# Patient Record
Sex: MALE | Race: WHITE | NOT HISPANIC OR LATINO | Employment: FULL TIME | URBAN - METROPOLITAN AREA
[De-identification: names, ages, dates, MRNs, and addresses within clinical notes are randomized per-mention and may not be internally consistent; named-entity substitution may affect disease eponyms.]

---

## 2017-01-26 ENCOUNTER — OFFICE VISIT (OUTPATIENT)
Dept: PSYCHIATRY | Facility: CLINIC | Age: 32
End: 2017-01-26
Payer: COMMERCIAL

## 2017-01-26 VITALS
HEART RATE: 88 BPM | DIASTOLIC BLOOD PRESSURE: 79 MMHG | HEIGHT: 72 IN | WEIGHT: 213 LBS | SYSTOLIC BLOOD PRESSURE: 133 MMHG | BODY MASS INDEX: 28.85 KG/M2

## 2017-01-26 DIAGNOSIS — S06.9X9S TBI (TRAUMATIC BRAIN INJURY), WITH LOSS OF CONSCIOUSNESS OF UNSPECIFIED DURATION, SEQUELA: ICD-10-CM

## 2017-01-26 DIAGNOSIS — F32.A DEPRESSION, UNSPECIFIED DEPRESSION TYPE: ICD-10-CM

## 2017-01-26 DIAGNOSIS — F90.9 ATTENTION DEFICIT HYPERACTIVITY DISORDER (ADHD), UNSPECIFIED ADHD TYPE: ICD-10-CM

## 2017-01-26 DIAGNOSIS — F43.10 PTSD (POST-TRAUMATIC STRESS DISORDER): Primary | ICD-10-CM

## 2017-01-26 PROCEDURE — 3075F SYST BP GE 130 - 139MM HG: CPT | Mod: S$GLB,,, | Performed by: PSYCHIATRY & NEUROLOGY

## 2017-01-26 PROCEDURE — 99999 PR PBB SHADOW E&M-EST. PATIENT-LVL III: CPT | Mod: PBBFAC,,, | Performed by: PSYCHIATRY & NEUROLOGY

## 2017-01-26 PROCEDURE — 1160F RVW MEDS BY RX/DR IN RCRD: CPT | Mod: S$GLB,,, | Performed by: PSYCHIATRY & NEUROLOGY

## 2017-01-26 PROCEDURE — 3078F DIAST BP <80 MM HG: CPT | Mod: S$GLB,,, | Performed by: PSYCHIATRY & NEUROLOGY

## 2017-01-26 PROCEDURE — 99215 OFFICE O/P EST HI 40 MIN: CPT | Mod: S$GLB,,, | Performed by: PSYCHIATRY & NEUROLOGY

## 2017-01-26 RX ORDER — ESCITALOPRAM OXALATE 10 MG/1
10 TABLET ORAL DAILY
Qty: 30 TABLET | Refills: 2 | Status: SHIPPED | OUTPATIENT
Start: 2017-01-26 | End: 2017-03-03 | Stop reason: DRUGHIGH

## 2017-01-26 RX ORDER — PRAZOSIN HYDROCHLORIDE 2 MG/1
2 CAPSULE ORAL NIGHTLY
Qty: 30 CAPSULE | Refills: 1 | Status: SHIPPED | OUTPATIENT
Start: 2017-01-26 | End: 2017-03-03 | Stop reason: SDUPTHER
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
## 2017-02-02 PROBLEM — S06.9XAA TBI (TRAUMATIC BRAIN INJURY): Status: ACTIVE | Noted: 2017-02-02

## 2017-02-03 NOTE — PROGRESS NOTES
This encounter was reviewed by me and case was discussed with the resident physician. Would use caution in using ssri given unclear mood stability, consider mood stabilizer, would encourage pt to stop ritalin if having mood swings. Recommend formal psych testing.

## 2017-02-16 ENCOUNTER — OFFICE VISIT (OUTPATIENT)
Dept: PSYCHIATRY | Facility: CLINIC | Age: 32
End: 2017-02-16
Payer: COMMERCIAL

## 2017-02-16 VITALS
DIASTOLIC BLOOD PRESSURE: 79 MMHG | HEART RATE: 83 BPM | HEIGHT: 71 IN | BODY MASS INDEX: 29.93 KG/M2 | WEIGHT: 213.81 LBS | SYSTOLIC BLOOD PRESSURE: 133 MMHG

## 2017-02-16 DIAGNOSIS — F32.A DEPRESSION, UNSPECIFIED DEPRESSION TYPE: ICD-10-CM

## 2017-02-16 DIAGNOSIS — S06.9X9S TBI (TRAUMATIC BRAIN INJURY), WITH LOSS OF CONSCIOUSNESS OF UNSPECIFIED DURATION, SEQUELA: ICD-10-CM

## 2017-02-16 DIAGNOSIS — F43.10 PTSD (POST-TRAUMATIC STRESS DISORDER): ICD-10-CM

## 2017-02-16 DIAGNOSIS — F90.2 ATTENTION DEFICIT HYPERACTIVITY DISORDER (ADHD), COMBINED TYPE: Primary | ICD-10-CM

## 2017-02-16 PROCEDURE — 99999 PR PBB SHADOW E&M-EST. PATIENT-LVL II: CPT | Mod: PBBFAC,,, | Performed by: PSYCHIATRY & NEUROLOGY

## 2017-02-16 PROCEDURE — 3078F DIAST BP <80 MM HG: CPT | Mod: S$GLB,,, | Performed by: PSYCHIATRY & NEUROLOGY

## 2017-02-16 PROCEDURE — 3075F SYST BP GE 130 - 139MM HG: CPT | Mod: S$GLB,,, | Performed by: PSYCHIATRY & NEUROLOGY

## 2017-02-16 PROCEDURE — 99213 OFFICE O/P EST LOW 20 MIN: CPT | Mod: S$GLB,,, | Performed by: PSYCHIATRY & NEUROLOGY

## 2017-02-16 PROCEDURE — 1160F RVW MEDS BY RX/DR IN RCRD: CPT | Mod: S$GLB,,, | Performed by: PSYCHIATRY & NEUROLOGY
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
## 2017-02-23 ENCOUNTER — OFFICE VISIT (OUTPATIENT)
Dept: PSYCHIATRY | Facility: CLINIC | Age: 32
End: 2017-02-23
Payer: COMMERCIAL

## 2017-02-23 DIAGNOSIS — F32.A DEPRESSION, UNSPECIFIED DEPRESSION TYPE: ICD-10-CM

## 2017-02-23 DIAGNOSIS — S06.9X9S TBI (TRAUMATIC BRAIN INJURY), WITH LOSS OF CONSCIOUSNESS OF UNSPECIFIED DURATION, SEQUELA: Primary | ICD-10-CM

## 2017-02-23 PROCEDURE — 90834 PSYTX W PT 45 MINUTES: CPT | Mod: S$GLB,,, | Performed by: SOCIAL WORKER
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
## 2017-03-03 ENCOUNTER — OFFICE VISIT (OUTPATIENT)
Dept: PSYCHIATRY | Facility: CLINIC | Age: 32
End: 2017-03-03
Payer: COMMERCIAL

## 2017-03-03 VITALS
DIASTOLIC BLOOD PRESSURE: 91 MMHG | HEIGHT: 71 IN | WEIGHT: 213 LBS | BODY MASS INDEX: 29.82 KG/M2 | HEART RATE: 101 BPM | SYSTOLIC BLOOD PRESSURE: 140 MMHG

## 2017-03-03 DIAGNOSIS — F43.10 PTSD (POST-TRAUMATIC STRESS DISORDER): ICD-10-CM

## 2017-03-03 DIAGNOSIS — F32.A DEPRESSION, UNSPECIFIED DEPRESSION TYPE: ICD-10-CM

## 2017-03-03 DIAGNOSIS — F90.2 ATTENTION DEFICIT HYPERACTIVITY DISORDER (ADHD), COMBINED TYPE: Primary | ICD-10-CM

## 2017-03-03 DIAGNOSIS — S06.9X9S TBI (TRAUMATIC BRAIN INJURY), WITH LOSS OF CONSCIOUSNESS OF UNSPECIFIED DURATION, SEQUELA: ICD-10-CM

## 2017-03-03 PROCEDURE — 3080F DIAST BP >= 90 MM HG: CPT | Mod: S$GLB,,, | Performed by: PSYCHIATRY & NEUROLOGY

## 2017-03-03 PROCEDURE — 99214 OFFICE O/P EST MOD 30 MIN: CPT | Mod: S$GLB,,, | Performed by: PSYCHIATRY & NEUROLOGY

## 2017-03-03 PROCEDURE — 99999 PR PBB SHADOW E&M-EST. PATIENT-LVL II: CPT | Mod: PBBFAC,,, | Performed by: PSYCHIATRY & NEUROLOGY

## 2017-03-03 PROCEDURE — 1160F RVW MEDS BY RX/DR IN RCRD: CPT | Mod: S$GLB,,, | Performed by: PSYCHIATRY & NEUROLOGY

## 2017-03-03 PROCEDURE — 3077F SYST BP >= 140 MM HG: CPT | Mod: S$GLB,,, | Performed by: PSYCHIATRY & NEUROLOGY

## 2017-03-03 RX ORDER — ESCITALOPRAM OXALATE 20 MG/1
20 TABLET ORAL DAILY
Qty: 30 TABLET | Refills: 2 | Status: SHIPPED | OUTPATIENT
Start: 2017-03-03 | End: 2017-12-04

## 2017-03-03 RX ORDER — TRAZODONE HYDROCHLORIDE 100 MG/1
200 TABLET ORAL NIGHTLY
Qty: 60 TABLET | Refills: 2 | Status: SHIPPED | OUTPATIENT
Start: 2017-03-03 | End: 2017-12-04

## 2017-03-03 RX ORDER — BACLOFEN 10 MG/1
10 TABLET ORAL 3 TIMES DAILY PRN
Qty: 90 TABLET | Refills: 2 | Status: SHIPPED | OUTPATIENT
Start: 2017-03-03 | End: 2017-12-04

## 2017-03-03 RX ORDER — PRAZOSIN HYDROCHLORIDE 2 MG/1
2 CAPSULE ORAL NIGHTLY
Qty: 30 CAPSULE | Refills: 2 | Status: SHIPPED | OUTPATIENT
Start: 2017-03-03 | End: 2017-12-04
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
## 2017-03-16 NOTE — PROGRESS NOTES
This encounter was reviewed by me and case was discussed with the resident physician. I agree with the assessment and  treatment plan as stated. Monitor for substance abuse given hx of cannabis use, excessive alcohol use and current ritalin.

## 2017-03-23 ENCOUNTER — OFFICE VISIT (OUTPATIENT)
Dept: PSYCHIATRY | Facility: CLINIC | Age: 32
End: 2017-03-23
Payer: COMMERCIAL

## 2017-03-23 VITALS
WEIGHT: 209 LBS | HEART RATE: 95 BPM | HEIGHT: 71 IN | BODY MASS INDEX: 29.26 KG/M2 | DIASTOLIC BLOOD PRESSURE: 85 MMHG | SYSTOLIC BLOOD PRESSURE: 150 MMHG

## 2017-03-23 DIAGNOSIS — F11.20 OPIOID USE DISORDER, SEVERE, DEPENDENCE: ICD-10-CM

## 2017-03-23 DIAGNOSIS — S06.9X9S TBI (TRAUMATIC BRAIN INJURY), WITH LOSS OF CONSCIOUSNESS OF UNSPECIFIED DURATION, SEQUELA: ICD-10-CM

## 2017-03-23 DIAGNOSIS — F90.2 ATTENTION DEFICIT HYPERACTIVITY DISORDER (ADHD), COMBINED TYPE: Primary | ICD-10-CM

## 2017-03-23 DIAGNOSIS — F32.A DEPRESSION, UNSPECIFIED DEPRESSION TYPE: ICD-10-CM

## 2017-03-23 DIAGNOSIS — F43.10 PTSD (POST-TRAUMATIC STRESS DISORDER): ICD-10-CM

## 2017-03-23 PROCEDURE — 3077F SYST BP >= 140 MM HG: CPT | Mod: S$GLB,,, | Performed by: PSYCHIATRY & NEUROLOGY

## 2017-03-23 PROCEDURE — 1160F RVW MEDS BY RX/DR IN RCRD: CPT | Mod: S$GLB,,, | Performed by: PSYCHIATRY & NEUROLOGY

## 2017-03-23 PROCEDURE — 3079F DIAST BP 80-89 MM HG: CPT | Mod: S$GLB,,, | Performed by: PSYCHIATRY & NEUROLOGY

## 2017-03-23 PROCEDURE — 99999 PR PBB SHADOW E&M-EST. PATIENT-LVL III: CPT | Mod: PBBFAC,,, | Performed by: PSYCHIATRY & NEUROLOGY

## 2017-03-23 PROCEDURE — 99215 OFFICE O/P EST HI 40 MIN: CPT | Mod: S$GLB,,, | Performed by: PSYCHIATRY & NEUROLOGY
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
## 2017-03-24 PROBLEM — F11.20 OPIOID USE DISORDER, SEVERE, DEPENDENCE: Status: ACTIVE | Noted: 2017-03-24

## 2017-04-06 ENCOUNTER — OFFICE VISIT (OUTPATIENT)
Dept: PSYCHIATRY | Facility: CLINIC | Age: 32
End: 2017-04-06
Payer: COMMERCIAL

## 2017-04-06 VITALS
SYSTOLIC BLOOD PRESSURE: 134 MMHG | BODY MASS INDEX: 29.68 KG/M2 | HEIGHT: 71 IN | HEART RATE: 111 BPM | WEIGHT: 212 LBS | DIASTOLIC BLOOD PRESSURE: 77 MMHG

## 2017-04-06 DIAGNOSIS — F90.2 ATTENTION DEFICIT HYPERACTIVITY DISORDER (ADHD), COMBINED TYPE: Primary | ICD-10-CM

## 2017-04-06 DIAGNOSIS — F32.A DEPRESSION, UNSPECIFIED DEPRESSION TYPE: ICD-10-CM

## 2017-04-06 DIAGNOSIS — S06.9X9S TBI (TRAUMATIC BRAIN INJURY), WITH LOC OF UNSPECIFIED DURATION, SEQUELA: ICD-10-CM

## 2017-04-06 DIAGNOSIS — F43.10 PTSD (POST-TRAUMATIC STRESS DISORDER): ICD-10-CM

## 2017-04-06 DIAGNOSIS — F11.20 OPIOID USE DISORDER, SEVERE, DEPENDENCE: ICD-10-CM

## 2017-04-06 PROCEDURE — 99214 OFFICE O/P EST MOD 30 MIN: CPT | Mod: S$GLB,,, | Performed by: PSYCHIATRY & NEUROLOGY

## 2017-04-06 PROCEDURE — 99999 PR PBB SHADOW E&M-EST. PATIENT-LVL III: CPT | Mod: PBBFAC,,, | Performed by: PSYCHIATRY & NEUROLOGY
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
## 2017-08-04 DIAGNOSIS — F43.10 PTSD (POST-TRAUMATIC STRESS DISORDER): Primary | ICD-10-CM

## 2017-08-04 RX ORDER — ALPRAZOLAM 1 MG/1
1 TABLET ORAL DAILY PRN
COMMUNITY
End: 2017-08-04 | Stop reason: SDUPTHER

## 2017-08-04 RX ORDER — ALPRAZOLAM 1 MG/1
1 TABLET ORAL DAILY PRN
Qty: 30 TABLET | Refills: 1 | Status: SHIPPED | OUTPATIENT
Start: 2017-08-04 | End: 2017-12-04

## 2017-08-04 RX ORDER — ALPRAZOLAM 3 MG/1
3 TABLET, EXTENDED RELEASE ORAL DAILY
Qty: 30 TABLET | Refills: 0 | Status: CANCELLED | OUTPATIENT
Start: 2017-08-04 | End: 2017-09-03

## 2017-08-04 NOTE — TELEPHONE ENCOUNTER
----- Message from Adele Phelan sent at 8/3/2017  2:47 PM CDT -----  Patient needs a refill on Xanax 30 mg. Please remit to:      [facility] - [place], [place] - [address]  [address] [address]  Phone: [phone] Fax: [phone]    Please call patient when completed at [phone].

## 2017-10-06 DIAGNOSIS — F43.10 PTSD (POST-TRAUMATIC STRESS DISORDER): ICD-10-CM

## 2017-10-06 RX ORDER — ALPRAZOLAM 1 MG/1
1 TABLET ORAL DAILY PRN
Qty: 30 TABLET | Refills: 1 | OUTPATIENT
Start: 2017-10-06

## 2017-10-06 NOTE — TELEPHONE ENCOUNTER
I have not treated patient for any of these issues in over a year (9/27/16).  He needs to contact the provider who has been treating him.

## 2017-10-06 NOTE — TELEPHONE ENCOUNTER
----- Message from Krystyna Huntley sent at 10/6/2017 10:06 AM CDT -----  Contact: Patient  Zaheer, patient [phone], Calling for refills on Rx Adderall 20 mg quantity 30 and anxiety medication. He is in between insurance right now. Please advise. Thanks.      [facility] - [facility] [address]  [address] [address]  Phone: [phone] Fax: [phone]

## 2017-10-06 NOTE — TELEPHONE ENCOUNTER
Pt is asking for Adderall but I don't see it in [facility]. When I spoke to pt, he states another dr was writing his Adderall, Anxiety, and anti-depressant medication. I notified pt that I don't think you will refill something you weren't prescribing before without being seen. Also says he was switched to Ritalin from Adderall but is asking for Adderall again.

## 2017-12-04 ENCOUNTER — OFFICE VISIT (OUTPATIENT)
Dept: PRIMARY CARE CLINIC | Facility: CLINIC | Age: 32
End: 2017-12-04
Payer: COMMERCIAL

## 2017-12-04 VITALS
RESPIRATION RATE: 18 BRPM | HEART RATE: 70 BPM | TEMPERATURE: 98 F | WEIGHT: 217.19 LBS | SYSTOLIC BLOOD PRESSURE: 133 MMHG | BODY MASS INDEX: 30.41 KG/M2 | DIASTOLIC BLOOD PRESSURE: 87 MMHG | HEIGHT: 71 IN

## 2017-12-04 DIAGNOSIS — F41.9 ANXIETY: ICD-10-CM

## 2017-12-04 DIAGNOSIS — F43.10 PTSD (POST-TRAUMATIC STRESS DISORDER): Primary | ICD-10-CM

## 2017-12-04 PROBLEM — R43.0 ANOSMIA: Status: ACTIVE | Noted: 2017-12-04

## 2017-12-04 PROCEDURE — 99999 PR PBB SHADOW E&M-EST. PATIENT-LVL III: CPT | Mod: PBBFAC,,, | Performed by: FAMILY MEDICINE

## 2017-12-04 PROCEDURE — 99214 OFFICE O/P EST MOD 30 MIN: CPT | Mod: S$GLB,,, | Performed by: FAMILY MEDICINE

## 2017-12-04 RX ORDER — METHYLPHENIDATE HYDROCHLORIDE 10 MG/1
TABLET ORAL
COMMUNITY
Start: 2017-12-01 | End: 2017-12-21

## 2017-12-04 RX ORDER — AMANTADINE HYDROCHLORIDE 100 MG/1
CAPSULE, GELATIN COATED ORAL
COMMUNITY
Start: 2017-11-02 | End: 2018-01-26

## 2017-12-04 RX ORDER — SERTRALINE HYDROCHLORIDE 25 MG/1
25 TABLET, FILM COATED ORAL DAILY
Qty: 30 TABLET | Refills: 3 | Status: SHIPPED | OUTPATIENT
Start: 2017-12-04 | End: 2017-12-21 | Stop reason: SDUPTHER

## 2017-12-04 RX ORDER — CLORAZEPATE DIPOTASSIUM 7.5 MG/1
7.5 TABLET ORAL DAILY PRN
Qty: 30 TABLET | Refills: 1 | Status: SHIPPED | OUTPATIENT
Start: 2017-12-04 | End: 2018-01-26 | Stop reason: SDUPTHER
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
## 2017-12-21 ENCOUNTER — TELEPHONE (OUTPATIENT)
Dept: PRIMARY CARE CLINIC | Facility: CLINIC | Age: 32
End: 2017-12-21

## 2017-12-21 DIAGNOSIS — F90.2 ATTENTION DEFICIT HYPERACTIVITY DISORDER (ADHD), COMBINED TYPE: Primary | ICD-10-CM

## 2017-12-21 DIAGNOSIS — F43.10 PTSD (POST-TRAUMATIC STRESS DISORDER): ICD-10-CM

## 2017-12-21 RX ORDER — SERTRALINE HYDROCHLORIDE 50 MG/1
50 TABLET, FILM COATED ORAL DAILY
Qty: 30 TABLET | Refills: 5 | Status: SHIPPED | OUTPATIENT
Start: 2017-12-21 | End: 2018-01-26 | Stop reason: SDUPTHER

## 2017-12-21 RX ORDER — DEXTROAMPHETAMINE SACCHARATE, AMPHETAMINE ASPARTATE, DEXTROAMPHETAMINE SULFATE AND AMPHETAMINE SULFATE 5; 5; 5; 5 MG/1; MG/1; MG/1; MG/1
1 TABLET ORAL DAILY
Qty: 30 TABLET | Refills: 0 | Status: SHIPPED | OUTPATIENT
Start: 2017-12-21 | End: 2018-01-26

## 2017-12-21 NOTE — TELEPHONE ENCOUNTER
Prescription for 50 mg of Zoloft and 20 mg of Adderall sent to pharmacy.  Have patient follow-up with me in 1 month.

## 2017-12-21 NOTE — TELEPHONE ENCOUNTER
Pt wants to increase the dose of his Zoloft to 50mg. He also says the Ritalin makes him tired and wants to go back to Adderall 20mg.

## 2017-12-21 NOTE — TELEPHONE ENCOUNTER
----- Message from Gypsy Monzon sent at 12/21/2017  9:09 AM CST -----  Contact: pt  Pt calling states need a refill sertraline (ZOLOFT) 25 MG tablet and want it to double to 50 mg,Adderal 20 mg...[phone](please notify when sent)        .  [facility] [address]  [address] [place] [address]  Phone: [phone] Fax: [phone]

## 2018-01-26 ENCOUNTER — OFFICE VISIT (OUTPATIENT)
Dept: PRIMARY CARE CLINIC | Facility: CLINIC | Age: 33
End: 2018-01-26
Payer: COMMERCIAL

## 2018-01-26 VITALS
BODY MASS INDEX: 30.24 KG/M2 | TEMPERATURE: 98 F | DIASTOLIC BLOOD PRESSURE: 89 MMHG | HEART RATE: 75 BPM | HEIGHT: 71 IN | OXYGEN SATURATION: 98 % | WEIGHT: 216 LBS | SYSTOLIC BLOOD PRESSURE: 141 MMHG | RESPIRATION RATE: 18 BRPM

## 2018-01-26 DIAGNOSIS — F90.2 ATTENTION DEFICIT HYPERACTIVITY DISORDER (ADHD), COMBINED TYPE: Primary | ICD-10-CM

## 2018-01-26 DIAGNOSIS — F41.9 ANXIETY: ICD-10-CM

## 2018-01-26 DIAGNOSIS — F43.10 PTSD (POST-TRAUMATIC STRESS DISORDER): ICD-10-CM

## 2018-01-26 PROCEDURE — 99999 PR PBB SHADOW E&M-EST. PATIENT-LVL III: CPT | Mod: PBBFAC,,, | Performed by: FAMILY MEDICINE

## 2018-01-26 PROCEDURE — 99213 OFFICE O/P EST LOW 20 MIN: CPT | Mod: S$GLB,,, | Performed by: FAMILY MEDICINE

## 2018-01-26 RX ORDER — DEXTROAMPHETAMINE SACCHARATE, AMPHETAMINE ASPARTATE MONOHYDRATE, DEXTROAMPHETAMINE SULFATE AND AMPHETAMINE SULFATE 7.5; 7.5; 7.5; 7.5 MG/1; MG/1; MG/1; MG/1
30 CAPSULE, EXTENDED RELEASE ORAL EVERY MORNING
Qty: 30 CAPSULE | Refills: 0 | Status: SHIPPED | OUTPATIENT
Start: 2018-01-26 | End: 2018-02-20

## 2018-01-26 RX ORDER — CLORAZEPATE DIPOTASSIUM 7.5 MG/1
7.5 TABLET ORAL DAILY PRN
Qty: 30 TABLET | Refills: 2 | Status: SHIPPED | OUTPATIENT
Start: 2018-01-26 | End: 2018-05-02 | Stop reason: SDUPTHER

## 2018-01-26 RX ORDER — SERTRALINE HYDROCHLORIDE 50 MG/1
50 TABLET, FILM COATED ORAL DAILY
Qty: 30 TABLET | Refills: 5 | Status: SHIPPED | OUTPATIENT
Start: 2018-01-26 | End: 2018-02-08 | Stop reason: SDUPTHER

## 2018-02-08 ENCOUNTER — TELEPHONE (OUTPATIENT)
Dept: PRIMARY CARE CLINIC | Facility: CLINIC | Age: 33
End: 2018-02-08

## 2018-02-08 DIAGNOSIS — F32.A DEPRESSION, UNSPECIFIED DEPRESSION TYPE: Primary | ICD-10-CM

## 2018-02-08 DIAGNOSIS — F43.10 PTSD (POST-TRAUMATIC STRESS DISORDER): ICD-10-CM

## 2018-02-08 RX ORDER — SERTRALINE HYDROCHLORIDE 100 MG/1
100 TABLET, FILM COATED ORAL DAILY
Qty: 30 TABLET | Refills: 5 | Status: SHIPPED | OUTPATIENT
Start: 2018-02-08 | End: 2018-06-06 | Stop reason: SDUPTHER

## 2018-02-08 NOTE — TELEPHONE ENCOUNTER
----- Message from Swati Acosta sent at 2/8/2018 12:45 PM CST -----  Contact: pt [phone]  Patient called and asked for a increase in his dosage on his Anti depression medication  To be called into regular pharmacy.

## 2018-02-20 ENCOUNTER — OFFICE VISIT (OUTPATIENT)
Dept: PRIMARY CARE CLINIC | Facility: CLINIC | Age: 33
End: 2018-02-20
Payer: COMMERCIAL

## 2018-02-20 VITALS
HEIGHT: 72 IN | SYSTOLIC BLOOD PRESSURE: 146 MMHG | WEIGHT: 203 LBS | BODY MASS INDEX: 27.5 KG/M2 | DIASTOLIC BLOOD PRESSURE: 74 MMHG | HEART RATE: 92 BPM | RESPIRATION RATE: 18 BRPM | OXYGEN SATURATION: 100 % | TEMPERATURE: 98 F

## 2018-02-20 DIAGNOSIS — F41.9 ANXIETY: ICD-10-CM

## 2018-02-20 DIAGNOSIS — F90.2 ATTENTION DEFICIT HYPERACTIVITY DISORDER (ADHD), COMBINED TYPE: Primary | ICD-10-CM

## 2018-02-20 DIAGNOSIS — F32.A DEPRESSION, UNSPECIFIED DEPRESSION TYPE: ICD-10-CM

## 2018-02-20 PROCEDURE — 99214 OFFICE O/P EST MOD 30 MIN: CPT | Mod: S$GLB,,, | Performed by: FAMILY MEDICINE

## 2018-02-20 PROCEDURE — 3008F BODY MASS INDEX DOCD: CPT | Mod: S$GLB,,, | Performed by: FAMILY MEDICINE

## 2018-02-20 PROCEDURE — 99999 PR PBB SHADOW E&M-EST. PATIENT-LVL III: CPT | Mod: PBBFAC,,, | Performed by: FAMILY MEDICINE

## 2018-02-20 RX ORDER — LISDEXAMFETAMINE DIMESYLATE 70 MG/1
70 CAPSULE ORAL EVERY MORNING
Qty: 30 CAPSULE | Refills: 0 | Status: SHIPPED | OUTPATIENT
Start: 2018-02-20 | End: 2018-03-15

## 2018-02-20 NOTE — PROGRESS NOTES
Subjective:       Patient ID: Zaheer Wall is a 32 y.o. male.    Chief Complaint: Medication Problem (pt wants to switch to Vyvanse. He has tried it before and it worked well )    Anxiety - doing well on Tranxene, no SE, takes 1/2-1 tablet most days  Depression - doing better since increasing dose of Zoloft, mood better  ADHD - still struggling with focus and attention on Adderall, but says did better on Vyvanse, wants to switch back.       Review of Systems   Constitutional: Negative for appetite change and fever.   HENT: Negative for trouble swallowing.    Eyes: Negative for visual disturbance.   Respiratory: Negative for shortness of breath.    Cardiovascular: Negative for chest pain.   Gastrointestinal: Negative for nausea and vomiting.   Genitourinary: Negative for difficulty urinating.   Musculoskeletal: Negative for joint swelling.   Skin: Negative for rash.   Hematological: Does not bruise/bleed easily.   Psychiatric/Behavioral: Negative for agitation, behavioral problems, confusion, self-injury and suicidal ideas.       Objective:      Vitals:    02/20/18 1412   BP: (!) 146/74   BP Location: Left arm   Patient Position: Sitting   BP Method: Large (Automatic)   Pulse: 92   Resp: 18   Temp: 98.4 °F (36.9 °C)   TempSrc: Oral   SpO2: 100%   Weight: 92.1 kg (203 lb)   Height: 6' (1.829 m)     Physical Exam   Constitutional: He is oriented to person, place, and time. He appears well-developed and well-nourished.   HENT:   Head: Normocephalic and atraumatic.   Mouth/Throat: Oropharynx is clear and moist.   Eyes: EOM are normal. Pupils are equal, round, and reactive to light.   Neck: Neck supple. No JVD present. Carotid bruit is not present.   Cardiovascular: Normal rate, regular rhythm and normal heart sounds.    Pulses:       Radial pulses are 2+ on the right side, and 2+ on the left side.   Pulmonary/Chest: Effort normal and breath sounds normal.   Abdominal: Soft. Bowel sounds are normal. There is no tenderness.    Musculoskeletal: He exhibits no edema.   Neurological: He is alert and oriented to person, place, and time.   Skin: Skin is warm and dry.   Psychiatric: He has a normal mood and affect. His speech is normal and behavior is normal. Judgment and thought content normal.   Nursing note and vitals reviewed.      Assessment:       1. Attention deficit hyperactivity disorder (ADHD), combined type Sub-optimally controlled   2. Anxiety Stable   3. Depression, unspecified depression type Stable       Plan:       Attention deficit hyperactivity disorder (ADHD), combined type  -     lisdexamfetamine (VYVANSE) 70 MG capsule; Take 1 capsule (70 mg total) by mouth every morning.  Dispense: 30 capsule; Refill: 0    Anxiety  Comments:  continue current meds    Depression, unspecified depression type  Comments:  continue current meds      Medication List with Changes/Refills   New Medications    LISDEXAMFETAMINE (VYVANSE) 70 MG CAPSULE    Take 1 capsule (70 mg total) by mouth every morning.   Current Medications    CLORAZEPATE (TRANXENE) 7.5 MG TAB    Take 1 tablet (7.5 mg total) by mouth daily as needed (anxiety).    SERTRALINE (ZOLOFT) 100 MG TABLET    Take 1 tablet (100 mg total) by mouth once daily.   Discontinued Medications    DEXTROAMPHETAMINE-AMPHETAMINE (ADDERALL XR) 30 MG 24 HR CAPSULE    Take 1 capsule (30 mg total) by mouth every morning.

## 2018-03-15 ENCOUNTER — OFFICE VISIT (OUTPATIENT)
Dept: PRIMARY CARE CLINIC | Facility: CLINIC | Age: 33
End: 2018-03-15
Payer: COMMERCIAL

## 2018-03-15 VITALS
BODY MASS INDEX: 29.52 KG/M2 | TEMPERATURE: 98 F | WEIGHT: 210.88 LBS | HEART RATE: 64 BPM | OXYGEN SATURATION: 99 % | DIASTOLIC BLOOD PRESSURE: 83 MMHG | RESPIRATION RATE: 18 BRPM | SYSTOLIC BLOOD PRESSURE: 122 MMHG | HEIGHT: 71 IN

## 2018-03-15 DIAGNOSIS — F90.2 ATTENTION DEFICIT HYPERACTIVITY DISORDER (ADHD), COMBINED TYPE: Primary | ICD-10-CM

## 2018-03-15 PROCEDURE — 3074F SYST BP LT 130 MM HG: CPT | Mod: CPTII,S$GLB,, | Performed by: FAMILY MEDICINE

## 2018-03-15 PROCEDURE — 99999 PR PBB SHADOW E&M-EST. PATIENT-LVL III: CPT | Mod: PBBFAC,,, | Performed by: FAMILY MEDICINE

## 2018-03-15 PROCEDURE — 3079F DIAST BP 80-89 MM HG: CPT | Mod: CPTII,S$GLB,, | Performed by: FAMILY MEDICINE

## 2018-03-15 PROCEDURE — 99213 OFFICE O/P EST LOW 20 MIN: CPT | Mod: S$GLB,,, | Performed by: FAMILY MEDICINE

## 2018-03-15 RX ORDER — DEXTROAMPHETAMINE SACCHARATE, AMPHETAMINE ASPARTATE, DEXTROAMPHETAMINE SULFATE AND AMPHETAMINE SULFATE 5; 5; 5; 5 MG/1; MG/1; MG/1; MG/1
1 TABLET ORAL 2 TIMES DAILY
Qty: 60 TABLET | Refills: 0 | Status: SHIPPED | OUTPATIENT
Start: 2018-03-15 | End: 2018-04-16

## 2018-03-15 NOTE — PROGRESS NOTES
"Subjective:       Patient ID: Zaheer Wall is a 32 y.o. male.    Chief Complaint: Follow-up (discuss changinging ADHD med)    ADHD meds have worked well in the morning (Vyvanse, Adderall XR), but feels efficacy wears off around midday with both meds tried. No adverse side effects.      Review of Systems   Constitutional: Negative for appetite change and unexpected weight change.   Cardiovascular: Negative for chest pain and leg swelling.   Neurological: Negative for dizziness and light-headedness.   Psychiatric/Behavioral: Negative for sleep disturbance.       Objective:      Vitals:    03/15/18 1304   BP: 122/83   BP Location: Right arm   Patient Position: Sitting   BP Method: Large (Automatic)   Pulse: 64   Resp: 18   Temp: 97.9 °F (36.6 °C)   TempSrc: Oral   SpO2: 99%   Weight: 95.7 kg (210 lb 14.4 oz)   Height: 5' 11" (1.803 m)     Physical Exam   Constitutional: He is oriented to person, place, and time. He appears well-developed and well-nourished.   HENT:   Head: Normocephalic and atraumatic.   Cardiovascular: Normal rate, regular rhythm and normal heart sounds.    Pulmonary/Chest: Effort normal and breath sounds normal.   Musculoskeletal: He exhibits no edema.   Neurological: He is alert and oriented to person, place, and time.   Skin: Skin is warm and dry.   Psychiatric: He has a normal mood and affect. His behavior is normal.   Nursing note and vitals reviewed.      Assessment:       1. Attention deficit hyperactivity disorder (ADHD), combined type        Plan:       Attention deficit hyperactivity disorder (ADHD), combined type  -     dextroamphetamine-amphetamine (ADDERALL) 20 mg tablet; Take 1 tablet by mouth 2 (two) times daily.  Dispense: 60 tablet; Refill: 0      Medication List with Changes/Refills   New Medications    DEXTROAMPHETAMINE-AMPHETAMINE (ADDERALL) 20 MG TABLET    Take 1 tablet by mouth 2 (two) times daily.   Current Medications    CLORAZEPATE (TRANXENE) 7.5 MG TAB    Take 1 tablet (7.5 mg " total) by mouth daily as needed (anxiety).    SERTRALINE (ZOLOFT) 100 MG TABLET    Take 1 tablet (100 mg total) by mouth once daily.   Discontinued Medications    LISDEXAMFETAMINE (VYVANSE) 70 MG CAPSULE    Take 1 capsule (70 mg total) by mouth every morning.

## 2018-04-16 ENCOUNTER — OFFICE VISIT (OUTPATIENT)
Dept: PRIMARY CARE CLINIC | Facility: CLINIC | Age: 33
End: 2018-04-16
Payer: COMMERCIAL

## 2018-04-16 VITALS
WEIGHT: 210 LBS | DIASTOLIC BLOOD PRESSURE: 95 MMHG | BODY MASS INDEX: 29.4 KG/M2 | SYSTOLIC BLOOD PRESSURE: 148 MMHG | TEMPERATURE: 98 F | HEART RATE: 84 BPM | OXYGEN SATURATION: 98 % | RESPIRATION RATE: 18 BRPM | HEIGHT: 71 IN

## 2018-04-16 DIAGNOSIS — F90.2 ATTENTION DEFICIT HYPERACTIVITY DISORDER (ADHD), COMBINED TYPE: Primary | ICD-10-CM

## 2018-04-16 PROCEDURE — 3077F SYST BP >= 140 MM HG: CPT | Mod: CPTII,S$GLB,, | Performed by: FAMILY MEDICINE

## 2018-04-16 PROCEDURE — 3080F DIAST BP >= 90 MM HG: CPT | Mod: CPTII,S$GLB,, | Performed by: FAMILY MEDICINE

## 2018-04-16 PROCEDURE — 99213 OFFICE O/P EST LOW 20 MIN: CPT | Mod: S$GLB,,, | Performed by: FAMILY MEDICINE

## 2018-04-16 PROCEDURE — 99999 PR PBB SHADOW E&M-EST. PATIENT-LVL III: CPT | Mod: PBBFAC,,, | Performed by: FAMILY MEDICINE

## 2018-04-16 RX ORDER — DEXTROAMPHETAMINE SACCHARATE, AMPHETAMINE ASPARTATE, DEXTROAMPHETAMINE SULFATE AND AMPHETAMINE SULFATE 2.5; 2.5; 2.5; 2.5 MG/1; MG/1; MG/1; MG/1
1 TABLET ORAL DAILY
Qty: 30 TABLET | Refills: 0 | Status: SHIPPED | OUTPATIENT
Start: 2018-04-16 | End: 2018-05-14 | Stop reason: SDUPTHER

## 2018-04-16 RX ORDER — DEXTROAMPHETAMINE SACCHARATE, AMPHETAMINE ASPARTATE MONOHYDRATE, DEXTROAMPHETAMINE SULFATE AND AMPHETAMINE SULFATE 7.5; 7.5; 7.5; 7.5 MG/1; MG/1; MG/1; MG/1
30 CAPSULE, EXTENDED RELEASE ORAL EVERY MORNING
Qty: 30 CAPSULE | Refills: 0 | Status: SHIPPED | OUTPATIENT
Start: 2018-04-16 | End: 2018-05-14 | Stop reason: SDUPTHER

## 2018-04-16 NOTE — PROGRESS NOTES
"Subjective:       Patient ID: Zaheer Wall is a 33 y.o. male.    Chief Complaint: Medication Refill (patient says the adderall 20mg BID is not working )    Feels like current Adderall regimen not working well, not lasting all day. Says he did best on Adderall XR, but seemed to wear off by mid-afternoon. No adverse SE WRT sleep and appetite      Review of Systems   Constitutional: Negative for appetite change and unexpected weight change.   Respiratory: Negative for shortness of breath.    Cardiovascular: Negative for chest pain and palpitations.   Psychiatric/Behavioral: Negative for agitation and sleep disturbance.       Objective:      Vitals:    04/16/18 1211   BP: (!) 148/95   BP Location: Left arm   Patient Position: Sitting   BP Method: Large (Automatic)   Pulse: 84   Resp: 18   Temp: 98 °F (36.7 °C)   TempSrc: Oral   SpO2: 98%   Weight: 95.3 kg (210 lb)   Height: 5' 11" (1.803 m)     Physical Exam   Constitutional: He is oriented to person, place, and time. He appears well-developed and well-nourished.   HENT:   Head: Normocephalic and atraumatic.   Cardiovascular: Normal rate, regular rhythm and normal heart sounds.    Pulmonary/Chest: Effort normal and breath sounds normal.   Musculoskeletal: He exhibits no edema.   Neurological: He is alert and oriented to person, place, and time.   Skin: Skin is warm and dry.   Psychiatric: He has a normal mood and affect. His behavior is normal.   Nursing note and vitals reviewed.      Assessment:       1. Attention deficit hyperactivity disorder (ADHD), combined type        Plan:       Attention deficit hyperactivity disorder (ADHD), combined type  -     dextroamphetamine-amphetamine (ADDERALL XR) 30 MG 24 hr capsule; Take 1 capsule (30 mg total) by mouth every morning.  Dispense: 30 capsule; Refill: 0  -     dextroamphetamine-amphetamine 10 mg Tab; Take 1 tablet by mouth once daily. In the afternoon  Dispense: 30 tablet; Refill: 0      Medication List with " Changes/Refills   New Medications    DEXTROAMPHETAMINE-AMPHETAMINE (ADDERALL XR) 30 MG 24 HR CAPSULE    Take 1 capsule (30 mg total) by mouth every morning.    DEXTROAMPHETAMINE-AMPHETAMINE 10 MG TAB    Take 1 tablet by mouth once daily. In the afternoon   Current Medications    CLORAZEPATE (TRANXENE) 7.5 MG TAB    Take 1 tablet (7.5 mg total) by mouth daily as needed (anxiety).    SERTRALINE (ZOLOFT) 100 MG TABLET    Take 1 tablet (100 mg total) by mouth once daily.   Discontinued Medications    DEXTROAMPHETAMINE-AMPHETAMINE (ADDERALL) 20 MG TABLET    Take 1 tablet by mouth 2 (two) times daily.

## 2018-05-02 DIAGNOSIS — F41.9 ANXIETY: ICD-10-CM

## 2018-05-02 RX ORDER — CLORAZEPATE DIPOTASSIUM 7.5 MG/1
TABLET ORAL
Qty: 30 TABLET | Refills: 3 | Status: SHIPPED | OUTPATIENT
Start: 2018-05-02 | End: 2018-09-04 | Stop reason: SDUPTHER

## 2018-05-14 ENCOUNTER — OFFICE VISIT (OUTPATIENT)
Dept: PRIMARY CARE CLINIC | Facility: CLINIC | Age: 33
End: 2018-05-14
Payer: COMMERCIAL

## 2018-05-14 VITALS
OXYGEN SATURATION: 96 % | HEART RATE: 68 BPM | RESPIRATION RATE: 18 BRPM | HEIGHT: 71 IN | DIASTOLIC BLOOD PRESSURE: 83 MMHG | WEIGHT: 209 LBS | SYSTOLIC BLOOD PRESSURE: 130 MMHG | BODY MASS INDEX: 29.26 KG/M2 | TEMPERATURE: 98 F

## 2018-05-14 DIAGNOSIS — F90.2 ATTENTION DEFICIT HYPERACTIVITY DISORDER (ADHD), COMBINED TYPE: Primary | ICD-10-CM

## 2018-05-14 PROCEDURE — 3079F DIAST BP 80-89 MM HG: CPT | Mod: CPTII,S$GLB,, | Performed by: FAMILY MEDICINE

## 2018-05-14 PROCEDURE — 99213 OFFICE O/P EST LOW 20 MIN: CPT | Mod: S$GLB,,, | Performed by: FAMILY MEDICINE

## 2018-05-14 PROCEDURE — 99999 PR PBB SHADOW E&M-EST. PATIENT-LVL III: CPT | Mod: PBBFAC,,, | Performed by: FAMILY MEDICINE

## 2018-05-14 PROCEDURE — 3008F BODY MASS INDEX DOCD: CPT | Mod: CPTII,S$GLB,, | Performed by: FAMILY MEDICINE

## 2018-05-14 PROCEDURE — 3075F SYST BP GE 130 - 139MM HG: CPT | Mod: CPTII,S$GLB,, | Performed by: FAMILY MEDICINE

## 2018-05-14 RX ORDER — DEXTROAMPHETAMINE SACCHARATE, AMPHETAMINE ASPARTATE MONOHYDRATE, DEXTROAMPHETAMINE SULFATE AND AMPHETAMINE SULFATE 7.5; 7.5; 7.5; 7.5 MG/1; MG/1; MG/1; MG/1
30 CAPSULE, EXTENDED RELEASE ORAL EVERY MORNING
Qty: 30 CAPSULE | Refills: 0 | Status: SHIPPED | OUTPATIENT
Start: 2018-05-14 | End: 2018-08-08 | Stop reason: ALTCHOICE

## 2018-05-14 RX ORDER — DEXTROAMPHETAMINE SACCHARATE, AMPHETAMINE ASPARTATE, DEXTROAMPHETAMINE SULFATE AND AMPHETAMINE SULFATE 2.5; 2.5; 2.5; 2.5 MG/1; MG/1; MG/1; MG/1
1 TABLET ORAL DAILY
Qty: 30 TABLET | Refills: 0 | Status: SHIPPED | OUTPATIENT
Start: 2018-05-14 | End: 2018-08-08 | Stop reason: ALTCHOICE

## 2018-05-14 RX ORDER — DEXTROAMPHETAMINE SACCHARATE, AMPHETAMINE ASPARTATE, DEXTROAMPHETAMINE SULFATE AND AMPHETAMINE SULFATE 2.5; 2.5; 2.5; 2.5 MG/1; MG/1; MG/1; MG/1
1 TABLET ORAL DAILY
Qty: 30 TABLET | Refills: 0 | Status: SHIPPED | OUTPATIENT
Start: 2018-06-13 | End: 2018-08-08 | Stop reason: ALTCHOICE

## 2018-05-14 RX ORDER — DEXTROAMPHETAMINE SACCHARATE, AMPHETAMINE ASPARTATE MONOHYDRATE, DEXTROAMPHETAMINE SULFATE AND AMPHETAMINE SULFATE 7.5; 7.5; 7.5; 7.5 MG/1; MG/1; MG/1; MG/1
30 CAPSULE, EXTENDED RELEASE ORAL EVERY MORNING
Qty: 30 CAPSULE | Refills: 0 | Status: SHIPPED | OUTPATIENT
Start: 2018-06-13 | End: 2018-08-08 | Stop reason: ALTCHOICE

## 2018-05-14 RX ORDER — DEXTROAMPHETAMINE SACCHARATE, AMPHETAMINE ASPARTATE MONOHYDRATE, DEXTROAMPHETAMINE SULFATE AND AMPHETAMINE SULFATE 7.5; 7.5; 7.5; 7.5 MG/1; MG/1; MG/1; MG/1
30 CAPSULE, EXTENDED RELEASE ORAL EVERY MORNING
Qty: 30 CAPSULE | Refills: 0 | Status: SHIPPED | OUTPATIENT
Start: 2018-07-13 | End: 2018-08-08

## 2018-05-14 RX ORDER — DEXTROAMPHETAMINE SACCHARATE, AMPHETAMINE ASPARTATE, DEXTROAMPHETAMINE SULFATE AND AMPHETAMINE SULFATE 2.5; 2.5; 2.5; 2.5 MG/1; MG/1; MG/1; MG/1
1 TABLET ORAL DAILY
Qty: 30 TABLET | Refills: 0 | Status: SHIPPED | OUTPATIENT
Start: 2018-07-13 | End: 2018-08-08

## 2018-05-14 NOTE — PROGRESS NOTES
"Subjective:       Patient ID: Zaheer Wall is a 33 y.o. male.    Chief Complaint: ADHD (10mg Adderall was added at his last visit )    Patient presents for ADHD medication refill.  Prescribed medication has been working well with regard to efficacy (improved focus and attention, less easily distracted).  Tolerating medication well without significant adverse side effects (loss of appetite, insomnia, irritability, chest pain/palpitations).      Review of Systems   Constitutional: Negative for unexpected weight change.   Respiratory: Negative for shortness of breath.    Cardiovascular: Negative for chest pain and palpitations.   Psychiatric/Behavioral: Negative for agitation, confusion and sleep disturbance.       Objective:      Vitals:    05/14/18 1210   BP: 130/83   BP Location: Left arm   Patient Position: Sitting   BP Method: Large (Automatic)   Pulse: 68   Resp: 18   Temp: 98.2 °F (36.8 °C)   TempSrc: Oral   SpO2: 96%   Weight: 94.8 kg (209 lb)   Height: 5' 11" (1.803 m)     Physical Exam   Constitutional: He is oriented to person, place, and time. He appears well-developed and well-nourished.   HENT:   Head: Normocephalic and atraumatic.   Cardiovascular: Normal rate, regular rhythm and normal heart sounds.    Pulmonary/Chest: Effort normal and breath sounds normal.   Musculoskeletal: He exhibits no edema.   Neurological: He is alert and oriented to person, place, and time.   Skin: Skin is warm and dry.   Psychiatric: He has a normal mood and affect. His behavior is normal.   Nursing note and vitals reviewed.      Assessment:       1. Attention deficit hyperactivity disorder (ADHD), combined type        Plan:       Attention deficit hyperactivity disorder (ADHD), combined type  -     dextroamphetamine-amphetamine (ADDERALL XR) 30 MG 24 hr capsule; Take 1 capsule (30 mg total) by mouth every morning.  Dispense: 30 capsule; Refill: 0  -     dextroamphetamine-amphetamine 10 mg Tab; Take 1 tablet by mouth once daily. " In the afternoon  Dispense: 30 tablet; Refill: 0  -     dextroamphetamine-amphetamine (ADDERALL XR) 30 MG 24 hr capsule; Take 1 capsule (30 mg total) by mouth every morning.  Dispense: 30 capsule; Refill: 0  -     dextroamphetamine-amphetamine (ADDERALL XR) 30 MG 24 hr capsule; Take 1 capsule (30 mg total) by mouth every morning.  Dispense: 30 capsule; Refill: 0  -     dextroamphetamine-amphetamine (ADDERALL) 10 mg Tab; Take 1 tablet by mouth once daily. In the afternoon  Dispense: 30 tablet; Refill: 0  -     dextroamphetamine-amphetamine (ADDERALL) 10 mg Tab; Take 1 tablet by mouth once daily. In the afternoon  Dispense: 30 tablet; Refill: 0      Medication List with Changes/Refills   New Medications    DEXTROAMPHETAMINE-AMPHETAMINE (ADDERALL XR) 30 MG 24 HR CAPSULE    Take 1 capsule (30 mg total) by mouth every morning.    DEXTROAMPHETAMINE-AMPHETAMINE (ADDERALL XR) 30 MG 24 HR CAPSULE    Take 1 capsule (30 mg total) by mouth every morning.    DEXTROAMPHETAMINE-AMPHETAMINE (ADDERALL) 10 MG TAB    Take 1 tablet by mouth once daily. In the afternoon    DEXTROAMPHETAMINE-AMPHETAMINE (ADDERALL) 10 MG TAB    Take 1 tablet by mouth once daily. In the afternoon   Current Medications    CLORAZEPATE (TRANXENE) 7.5 MG TAB    TAKE ONE TABLET BY MOUTH ONCE DAILY FOR anxiety    SERTRALINE (ZOLOFT) 100 MG TABLET    Take 1 tablet (100 mg total) by mouth once daily.   Changed and/or Refilled Medications    Modified Medication Previous Medication    DEXTROAMPHETAMINE-AMPHETAMINE (ADDERALL XR) 30 MG 24 HR CAPSULE dextroamphetamine-amphetamine (ADDERALL XR) 30 MG 24 hr capsule       Take 1 capsule (30 mg total) by mouth every morning.    Take 1 capsule (30 mg total) by mouth every morning.    DEXTROAMPHETAMINE-AMPHETAMINE 10 MG TAB dextroamphetamine-amphetamine 10 mg Tab       Take 1 tablet by mouth once daily. In the afternoon    Take 1 tablet by mouth once daily. In the afternoon

## 2018-06-06 ENCOUNTER — TELEPHONE (OUTPATIENT)
Dept: PRIMARY CARE CLINIC | Facility: CLINIC | Age: 33
End: 2018-06-06

## 2018-06-06 DIAGNOSIS — F43.10 PTSD (POST-TRAUMATIC STRESS DISORDER): ICD-10-CM

## 2018-06-06 RX ORDER — SERTRALINE HYDROCHLORIDE 100 MG/1
200 TABLET, FILM COATED ORAL DAILY
Qty: 60 TABLET | Refills: 5 | Status: SHIPPED | OUTPATIENT
Start: 2018-06-06 | End: 2018-07-04 | Stop reason: SDUPTHER

## 2018-06-06 NOTE — TELEPHONE ENCOUNTER
----- Message from Krystyna Yuko sent at 6/6/2018 11:31 AM CDT -----  Contact: Patient  Type: Needs Medical Advice    Who Called:  aZheer, patient  Symptoms (please be specific):  N/A  How long has patient had these symptoms:  ?  Pharmacy name and phone #:    [facility] - [place], [place] - [address]  [address] [place] [address]  Phone: [phone] Fax: [phone]    Best Call Back Number: [phone]  Additional Information: Calling to ask if Rx sertraline (ZOLOFT) 100 MG tablet can be increased. Still having issues. Please advise. Thanks.

## 2018-06-06 NOTE — TELEPHONE ENCOUNTER
Patient doesn't think the Zoloft is working anymore. He is asking for it to be increased or switched to something else

## 2018-06-06 NOTE — TELEPHONE ENCOUNTER
Patient notified and states understanding. Appointment made with alla in 6 weeks since Dr. Qiu will be out of town

## 2018-07-04 DIAGNOSIS — F43.10 PTSD (POST-TRAUMATIC STRESS DISORDER): ICD-10-CM

## 2018-07-05 RX ORDER — SERTRALINE HYDROCHLORIDE 100 MG/1
TABLET, FILM COATED ORAL
Qty: 30 TABLET | Refills: 5 | Status: SHIPPED | OUTPATIENT
Start: 2018-07-05 | End: 2019-04-21 | Stop reason: SDUPTHER

## 2018-07-12 ENCOUNTER — TELEPHONE (OUTPATIENT)
Dept: PRIMARY CARE CLINIC | Facility: CLINIC | Age: 33
End: 2018-07-12

## 2018-07-12 NOTE — TELEPHONE ENCOUNTER
I spoke to Sully with [facility] and OK'd a one day early refill on patients Adderall     Patient notified

## 2018-07-12 NOTE — TELEPHONE ENCOUNTER
----- Message from Rosemarie Soto sent at 7/12/2018 10:09 AM CDT -----  Contact: self   Type:  RX Refill Request    Who Called: patient   Refill or New Rx: refill   RX Name and Strength: dextroamphetamine-amphetamine (ADDERALL XR) 30 MG 24 hr capsule, dextroamphetamine-amphetamine (ADDERALL) 10 mg Tab  Preferred Pharmacy with phone number:     [facility] - [place] - [address]  [address] [place] [address]  Phone: [phone] Fax: [phone]    Best Call Back Number: [phone]   Additional Info: patient needs filled today, he is going out of town

## 2018-08-08 ENCOUNTER — OFFICE VISIT (OUTPATIENT)
Dept: PRIMARY CARE CLINIC | Facility: CLINIC | Age: 33
End: 2018-08-08
Payer: COMMERCIAL

## 2018-08-08 VITALS
HEART RATE: 73 BPM | TEMPERATURE: 99 F | BODY MASS INDEX: 28.98 KG/M2 | SYSTOLIC BLOOD PRESSURE: 132 MMHG | DIASTOLIC BLOOD PRESSURE: 84 MMHG | RESPIRATION RATE: 18 BRPM | OXYGEN SATURATION: 98 % | WEIGHT: 207 LBS | HEIGHT: 71 IN

## 2018-08-08 DIAGNOSIS — Z13.220 LIPID SCREENING: ICD-10-CM

## 2018-08-08 DIAGNOSIS — N52.9 ERECTILE DYSFUNCTION, UNSPECIFIED ERECTILE DYSFUNCTION TYPE: Primary | ICD-10-CM

## 2018-08-08 DIAGNOSIS — F90.9 ATTENTION DEFICIT HYPERACTIVITY DISORDER (ADHD), UNSPECIFIED ADHD TYPE: ICD-10-CM

## 2018-08-08 PROCEDURE — 99999 PR PBB SHADOW E&M-EST. PATIENT-LVL III: CPT | Mod: PBBFAC,,, | Performed by: NURSE PRACTITIONER

## 2018-08-08 PROCEDURE — 3075F SYST BP GE 130 - 139MM HG: CPT | Mod: CPTII,S$GLB,, | Performed by: NURSE PRACTITIONER

## 2018-08-08 PROCEDURE — 3079F DIAST BP 80-89 MM HG: CPT | Mod: CPTII,S$GLB,, | Performed by: NURSE PRACTITIONER

## 2018-08-08 PROCEDURE — 99214 OFFICE O/P EST MOD 30 MIN: CPT | Mod: S$GLB,,, | Performed by: NURSE PRACTITIONER

## 2018-08-08 PROCEDURE — 3008F BODY MASS INDEX DOCD: CPT | Mod: CPTII,S$GLB,, | Performed by: NURSE PRACTITIONER

## 2018-08-08 NOTE — PROGRESS NOTES
Chief Complaint  Chief Complaint   Patient presents with    Medication Refill       HPI  Zaheer Wall is a 33 y.o. male with multiple medical diagnoses as listed in the medical history and problem list that presents for medication refill.    ADHD-patient with a history of ADHD currently on Adderall XR 30 mg daily.  Reports compliance with current medication regimen.   checked and Adderall filled 1 time monthly.  Diagnosed with ADHD approximately 15 years ago.  Reports worsening of his symptoms after traumatic brain injury several years ago.  States that he has tried several medication regimens in the past and has yet to find 1 that successfully increases his focus and concentration.  Reports compliance with current regimen but complains that the medication is not consistent throughout the day.  States that the effectiveness waxes and wanes.  Patient requesting to switch from his current medication regimen to Vyvanse daily.  Questioning whether not he can continue to take his Ritalin as needed in the evening as an adjunct to Vyvanse.          PAST MEDICAL HISTORY:  Past Medical History:   Diagnosis Date    ADHD (attention deficit hyperactivity disorder)     Anxiety     Nadia Barr virus infection        PAST SURGICAL HISTORY:  Past Surgical History:   Procedure Laterality Date    TONSILLECTOMY         SOCIAL HISTORY:  Social History     Social History    Marital status:      Spouse name: N/A    Number of children: N/A    Years of education: N/A     Occupational History    [employer]     Social History Main Topics    Smoking status: Current Every Day Smoker     Packs/day: 1.00     Years: 10.00     Types: Cigarettes    Smokeless tobacco: Never Used    Alcohol use 0.6 oz/week     1 Glasses of wine per week      Comment: a glass wine / beer per night    Drug use: No    Sexual activity: Yes     Partners: Female     Birth control/ protection: None     Other Topics  "Concern    Not on file     Social History Narrative    No narrative on file       FAMILY HISTORY:  Family History   Problem Relation Age of Onset    Heart disease Father     Hypertension Father     ADD / ADHD Brother        ALLERGIES AND MEDICATIONS: updated and reviewed.  Review of patient's allergies indicates:  No Known Allergies  Current Outpatient Prescriptions   Medication Sig Dispense Refill    clorazepate (TRANXENE) 7.5 MG Tab TAKE ONE TABLET BY MOUTH ONCE DAILY FOR anxiety 30 tablet 3    dextroamphetamine-amphetamine (ADDERALL XR) 30 MG 24 hr capsule Take 1 capsule (30 mg total) by mouth every morning. 30 capsule 0    dextroamphetamine-amphetamine (ADDERALL) 10 mg Tab Take 1 tablet by mouth once daily. In the afternoon 30 tablet 0    sertraline (ZOLOFT) 100 MG tablet TAKE ONE TABLET BY MOUTH ONCE DAILY 30 tablet 5     No current facility-administered medications for this visit.          ROS  Review of Systems   Constitutional: Negative for chills, fatigue and fever.   HENT: Negative for congestion, rhinorrhea, sinus pressure and sore throat.    Respiratory: Negative for cough, chest tightness and shortness of breath.    Cardiovascular: Negative for chest pain and palpitations.   Gastrointestinal: Negative for abdominal pain, blood in stool, diarrhea, nausea and vomiting.   Genitourinary: Negative for dysuria, frequency, hematuria and urgency.   Musculoskeletal: Negative for arthralgias and back pain.   Skin: Negative for rash and wound.   Neurological: Negative for dizziness and headaches.   Psychiatric/Behavioral: Positive for decreased concentration. Negative for dysphoric mood and sleep disturbance. The patient is nervous/anxious.          PHYSICAL EXAM  Vitals:    08/08/18 1535   BP: 132/84   BP Location: Right arm   Patient Position: Sitting   BP Method: Medium (Automatic)   Pulse: 73   Resp: 18   Temp: 98.9 °F (37.2 °C)   TempSrc: Oral   SpO2: 98%   Weight: 93.9 kg (207 lb)   Height: 5' 11" " "(1.803 m)    Body mass index is 28.87 kg/m².  Weight: 93.9 kg (207 lb)   Height: 5' 11" (180.3 cm)     Physical Exam   Constitutional: He is oriented to person, place, and time. He appears well-developed and well-nourished.   HENT:   Head: Normocephalic.   Right Ear: Tympanic membrane normal.   Left Ear: Tympanic membrane normal.   Mouth/Throat: Uvula is midline, oropharynx is clear and moist and mucous membranes are normal.   Eyes: Conjunctivae are normal.   Cardiovascular: Normal rate, regular rhythm, normal heart sounds and normal pulses.    No murmur heard.  Pulses:       Radial pulses are 2+ on the right side, and 2+ on the left side.   No LE swelling noted   Pulmonary/Chest: Effort normal and breath sounds normal. He has no wheezes.   Abdominal: Soft. Bowel sounds are normal. There is no tenderness.   Musculoskeletal: He exhibits no edema.   Lymphadenopathy:     He has no cervical adenopathy.   Neurological: He is alert and oriented to person, place, and time.   Skin: Skin is warm and dry. No rash noted.   Psychiatric: His mood appears anxious. His affect is inappropriate.         Health Maintenance       Date Due Completion Date    Pneumococcal PPSV23 (Medium Risk) (1) 03/28/2001 ---    Influenza Vaccine 08/01/2018 ---    Lipid Panel 09/29/2020 9/29/2015    TETANUS VACCINE 03/19/2024 3/19/2014            Assessment & Plan    Zaheer was seen today for medication refill.    Diagnoses and all orders for this visit:    Erectile dysfunction, unspecified erectile dysfunction type  -     CBC auto differential; Future  -     Comprehensive metabolic panel; Future  -     Lipid panel; Future  -     TSH; Future  -     Testosterone, free; Future  -     Testosterone; Future    Lipid screening  -     Lipid panel; Future    Attention deficit hyperactivity disorder (ADHD), unspecified ADHD type  -     POCT BUP Urine Drug Test        Follow-up: No Follow-up on file.    "

## 2018-08-09 ENCOUNTER — TELEPHONE (OUTPATIENT)
Dept: PRIMARY CARE CLINIC | Facility: CLINIC | Age: 33
End: 2018-08-09

## 2018-08-09 NOTE — TELEPHONE ENCOUNTER
Please call the patient and let him know that, due to the fact that he refused his random urine testing at his visit, I will be unable to treat him with any controlled substances including his Adderall or Vyvanse. Please have him follow up with Dr. Qiu at his first available to further discuss.

## 2018-08-10 ENCOUNTER — TELEPHONE (OUTPATIENT)
Dept: PRIMARY CARE CLINIC | Facility: CLINIC | Age: 33
End: 2018-08-10

## 2018-08-10 DIAGNOSIS — F90.2 ATTENTION DEFICIT HYPERACTIVITY DISORDER (ADHD), COMBINED TYPE: Primary | ICD-10-CM

## 2018-08-10 RX ORDER — DEXTROAMPHETAMINE SACCHARATE, AMPHETAMINE ASPARTATE MONOHYDRATE, DEXTROAMPHETAMINE SULFATE AND AMPHETAMINE SULFATE 7.5; 7.5; 7.5; 7.5 MG/1; MG/1; MG/1; MG/1
30 CAPSULE, EXTENDED RELEASE ORAL EVERY MORNING
Qty: 13 CAPSULE | Refills: 0 | Status: SHIPPED | OUTPATIENT
Start: 2018-08-10 | End: 2018-08-23 | Stop reason: SDUPTHER

## 2018-08-10 RX ORDER — DEXTROAMPHETAMINE SACCHARATE, AMPHETAMINE ASPARTATE, DEXTROAMPHETAMINE SULFATE AND AMPHETAMINE SULFATE 2.5; 2.5; 2.5; 2.5 MG/1; MG/1; MG/1; MG/1
1 TABLET ORAL DAILY
Qty: 13 TABLET | Refills: 0 | Status: SHIPPED | OUTPATIENT
Start: 2018-08-10 | End: 2018-08-23 | Stop reason: SDUPTHER

## 2018-08-10 NOTE — TELEPHONE ENCOUNTER
----- Message from Corrie Meyer sent at 8/10/2018  2:14 PM CDT -----  Contact: [facility]/ [facility] ph#[phone]  [facility] ph#[phone]  Please call checking the quality of medication

## 2018-08-10 NOTE — TELEPHONE ENCOUNTER
Nurse called both home number and cell numbers yesterday at 1145 am. Home spoke with Wife and patient was not in, she encouraged me to call his cell and leave message which I did requesting a call back. Nurse called this am 8/10/18 at 933am, left message again on cell requesting a call back.

## 2018-08-10 NOTE — TELEPHONE ENCOUNTER
Spoke with joey at [facility] notified them 13 pills is correct. Carmen is just giving patient enough pills until he can see Dr. Qiu. States understanding

## 2018-08-10 NOTE — TELEPHONE ENCOUNTER
Nurse manager spoke with patient and notified him that, because he refused IV random urine drug test, I would be unable to treat him for any controlled substances from this point forward.  He stated he would like a follow-up with Dr. Qiu to discuss.  We let him know that I will refill 13 days of Adderall to until his upcoming appointment and he can discuss his options with Dr. Qiu.  Still refusing a urine drug test at this point.

## 2018-08-10 NOTE — TELEPHONE ENCOUNTER
Mr Wall returned my calll. Nurse advised him that Ms Key will not treat him any more and he will have to see Dr Qiu. Ms Key said she will fill adderall with enough pills to come back and see Dr Qiu. Patient does not know how many pills he has left and will call nurse back. Once he does that nurse will schedule appt with Dr Mcneill and will have Ms Key send in scrip for enough pills to cover that visit. Patient acknowledged and will call me back.

## 2018-08-10 NOTE — TELEPHONE ENCOUNTER
Mr Wall called back. After today will be out of pills. Message sent to Ms Key to fill 13 day supply of adderall 30 mg for am and adderall 10mg for pm, which should last him to see dr jackson. Scrip to be sent to [facility]

## 2018-08-23 ENCOUNTER — OFFICE VISIT (OUTPATIENT)
Dept: PRIMARY CARE CLINIC | Facility: CLINIC | Age: 33
End: 2018-08-23
Payer: COMMERCIAL

## 2018-08-23 VITALS
TEMPERATURE: 99 F | HEART RATE: 74 BPM | RESPIRATION RATE: 18 BRPM | SYSTOLIC BLOOD PRESSURE: 130 MMHG | BODY MASS INDEX: 28.7 KG/M2 | DIASTOLIC BLOOD PRESSURE: 80 MMHG | HEIGHT: 71 IN | WEIGHT: 205 LBS | OXYGEN SATURATION: 99 %

## 2018-08-23 DIAGNOSIS — F90.2 ATTENTION DEFICIT HYPERACTIVITY DISORDER (ADHD), COMBINED TYPE: Primary | ICD-10-CM

## 2018-08-23 DIAGNOSIS — Z79.899 HIGH RISK MEDICATION USE: ICD-10-CM

## 2018-08-23 LAB
AMP D-AMPHETAMINE 1000 NG/ML: POSITIVE
BAR SECOBARBITAL 300 NG/ML: NEGATIVE
BUP BUPRENORPHINE 10 NG/ML: POSITIVE
BZO OXAZEPAM 300 NG/ML: NEGATIVE
COC BENZOYLECGONINE 300 NG/ML: NEGATIVE
CTP QC/QA: YES
MET D-METHAMPHETAMINE 500 NG/ML: NEGATIVE
MOP MORPHINE 300 NG/ML: NEGATIVE
MTD METHADONE 300 NG/ML: NEGATIVE
QXY OXYCODONE 100 NG/ML: NEGATIVE
THC 11-NOR-9-TETRAHYDROCANNABINOL-9-CARBOXYLIC ACID: NEGATIVE

## 2018-08-23 PROCEDURE — 3075F SYST BP GE 130 - 139MM HG: CPT | Mod: CPTII,S$GLB,, | Performed by: FAMILY MEDICINE

## 2018-08-23 PROCEDURE — 3079F DIAST BP 80-89 MM HG: CPT | Mod: CPTII,S$GLB,, | Performed by: FAMILY MEDICINE

## 2018-08-23 PROCEDURE — 80305 DRUG TEST PRSMV DIR OPT OBS: CPT | Mod: QW,S$GLB,, | Performed by: FAMILY MEDICINE

## 2018-08-23 PROCEDURE — 99999 PR PBB SHADOW E&M-EST. PATIENT-LVL III: CPT | Mod: PBBFAC,,, | Performed by: FAMILY MEDICINE

## 2018-08-23 PROCEDURE — 99213 OFFICE O/P EST LOW 20 MIN: CPT | Mod: S$GLB,,, | Performed by: FAMILY MEDICINE

## 2018-08-23 PROCEDURE — 3008F BODY MASS INDEX DOCD: CPT | Mod: CPTII,S$GLB,, | Performed by: FAMILY MEDICINE

## 2018-08-23 RX ORDER — DEXTROAMPHETAMINE SACCHARATE, AMPHETAMINE ASPARTATE, DEXTROAMPHETAMINE SULFATE AND AMPHETAMINE SULFATE 2.5; 2.5; 2.5; 2.5 MG/1; MG/1; MG/1; MG/1
1 TABLET ORAL DAILY
Qty: 30 TABLET | Refills: 0 | Status: SHIPPED | OUTPATIENT
Start: 2018-08-23 | End: 2018-09-26

## 2018-08-23 RX ORDER — DEXTROAMPHETAMINE SACCHARATE, AMPHETAMINE ASPARTATE MONOHYDRATE, DEXTROAMPHETAMINE SULFATE AND AMPHETAMINE SULFATE 7.5; 7.5; 7.5; 7.5 MG/1; MG/1; MG/1; MG/1
30 CAPSULE, EXTENDED RELEASE ORAL EVERY MORNING
Qty: 30 CAPSULE | Refills: 2 | Status: CANCELLED | OUTPATIENT
Start: 2018-08-23

## 2018-08-23 RX ORDER — DEXTROAMPHETAMINE SACCHARATE, AMPHETAMINE ASPARTATE MONOHYDRATE, DEXTROAMPHETAMINE SULFATE AND AMPHETAMINE SULFATE 7.5; 7.5; 7.5; 7.5 MG/1; MG/1; MG/1; MG/1
30 CAPSULE, EXTENDED RELEASE ORAL EVERY MORNING
Qty: 30 CAPSULE | Refills: 0 | Status: SHIPPED | OUTPATIENT
Start: 2018-08-23 | End: 2018-09-26 | Stop reason: SDUPTHER

## 2018-08-23 RX ORDER — DEXTROAMPHETAMINE SACCHARATE, AMPHETAMINE ASPARTATE, DEXTROAMPHETAMINE SULFATE AND AMPHETAMINE SULFATE 2.5; 2.5; 2.5; 2.5 MG/1; MG/1; MG/1; MG/1
1 TABLET ORAL DAILY
Qty: 30 TABLET | Refills: 2 | Status: CANCELLED | OUTPATIENT
Start: 2018-08-23

## 2018-08-23 NOTE — PROGRESS NOTES
"Subjective:       Patient ID: Zaheer Wall is a 33 y.o. male.    Chief Complaint: Medication Refill (Pt. requesting RX refill on Adderral )    Patient presents for ADHD medication refill.  Prescribed medication has been working well with regard to efficacy (improved focus and attention, less easily distracted).  Tolerating medication well without significant adverse side effects (loss of appetite, insomnia, irritability, chest pain/palpitations).      Review of Systems   Constitutional: Negative for unexpected weight change.   Respiratory: Negative for shortness of breath.    Cardiovascular: Negative for chest pain.   Psychiatric/Behavioral: Negative for confusion and suicidal ideas.       Objective:      Vitals:    08/23/18 1300   BP: 130/80   BP Location: Left arm   Patient Position: Sitting   BP Method: Large (Automatic)   Pulse: 74   Resp: 18   Temp: 98.5 °F (36.9 °C)   TempSrc: Oral   SpO2: 99%   Weight: 93 kg (205 lb)   Height: 5' 11" (1.803 m)     Physical Exam   Constitutional: He is oriented to person, place, and time. He appears well-developed and well-nourished.   HENT:   Head: Normocephalic and atraumatic.   Cardiovascular: Normal rate, regular rhythm and normal heart sounds.   Pulmonary/Chest: Effort normal and breath sounds normal.   Musculoskeletal: He exhibits no edema.   Neurological: He is alert and oriented to person, place, and time.   Skin: Skin is warm and dry.   Psychiatric: His behavior is normal. Thought content normal.   Nursing note and vitals reviewed.      Assessment:       1. Attention deficit hyperactivity disorder (ADHD), combined type    2. High risk medication use        Plan:       Attention deficit hyperactivity disorder (ADHD), combined type  -     dextroamphetamine-amphetamine (ADDERALL XR) 30 MG 24 hr capsule; Take 1 capsule (30 mg total) by mouth every morning.  Dispense: 30 capsule; Refill: 0  -     dextroamphetamine-amphetamine (ADDERALL) 10 mg Tab; Take 1 tablet by mouth once " daily.  Dispense: 30 tablet; Refill: 0    High risk medication use  -     POCT BUP Urine Drug Test    Other orders  -     Cancel: dextroamphetamine-amphetamine (ADDERALL XR) 30 MG 24 hr capsule; Take 1 capsule (30 mg total) by mouth every morning.  Dispense: 30 capsule; Refill: 2  -     Cancel: dextroamphetamine-amphetamine (ADDERALL) 10 mg Tab; Take 1 tablet by mouth once daily.  Dispense: 30 tablet; Refill: 2     Urine drug screen positive for amphetamines and buprenorphine.  Patient admitted that he has sometimes taking Suboxone from a friend to help with his joint pain. Patient had modest that he absolutely cannot take any controlled substances, or any others for that matter, not prescribed for him.  If any future drug screens are positive for any meds not prescribed for him, I will no longer be able to prescribe Adderall.  Patient verbalized understanding.  Medication List with Changes/Refills   Current Medications    CLORAZEPATE (TRANXENE) 7.5 MG TAB    TAKE ONE TABLET BY MOUTH ONCE DAILY FOR anxiety    SERTRALINE (ZOLOFT) 100 MG TABLET    TAKE ONE TABLET BY MOUTH ONCE DAILY   Changed and/or Refilled Medications    Modified Medication Previous Medication    DEXTROAMPHETAMINE-AMPHETAMINE (ADDERALL XR) 30 MG 24 HR CAPSULE dextroamphetamine-amphetamine (ADDERALL XR) 30 MG 24 hr capsule       Take 1 capsule (30 mg total) by mouth every morning.    Take 1 capsule (30 mg total) by mouth every morning.    DEXTROAMPHETAMINE-AMPHETAMINE (ADDERALL) 10 MG TAB dextroamphetamine-amphetamine (ADDERALL) 10 mg Tab       Take 1 tablet by mouth once daily.    Take 1 tablet by mouth once daily.

## 2018-09-04 DIAGNOSIS — F41.9 ANXIETY: ICD-10-CM

## 2018-09-04 RX ORDER — CLORAZEPATE DIPOTASSIUM 7.5 MG/1
7.5 TABLET ORAL DAILY PRN
Qty: 30 TABLET | Refills: 2 | Status: SHIPPED | OUTPATIENT
Start: 2018-09-04

## 2018-09-11 ENCOUNTER — HOSPITAL ENCOUNTER (EMERGENCY)
Facility: HOSPITAL | Age: 33
Discharge: PSYCHIATRIC OR SUBSTANCE USE FACILITY | End: 2018-09-11
Attending: EMERGENCY MEDICINE
Payer: COMMERCIAL

## 2018-09-11 ENCOUNTER — OFFICE VISIT (OUTPATIENT)
Dept: PSYCHIATRY | Facility: CLINIC | Age: 33
End: 2018-09-11
Payer: COMMERCIAL

## 2018-09-11 VITALS
HEIGHT: 71 IN | HEART RATE: 85 BPM | WEIGHT: 200.75 LBS | BODY MASS INDEX: 27.89 KG/M2 | SYSTOLIC BLOOD PRESSURE: 145 MMHG | WEIGHT: 200 LBS | DIASTOLIC BLOOD PRESSURE: 93 MMHG | HEART RATE: 84 BPM | RESPIRATION RATE: 20 BRPM | BODY MASS INDEX: 28.1 KG/M2 | TEMPERATURE: 98 F | DIASTOLIC BLOOD PRESSURE: 86 MMHG | SYSTOLIC BLOOD PRESSURE: 138 MMHG | OXYGEN SATURATION: 98 %

## 2018-09-11 DIAGNOSIS — F32.A DEPRESSION, UNSPECIFIED DEPRESSION TYPE: Primary | ICD-10-CM

## 2018-09-11 DIAGNOSIS — F41.9 ANXIETY: ICD-10-CM

## 2018-09-11 DIAGNOSIS — R45.851 SUICIDAL IDEATION: Primary | ICD-10-CM

## 2018-09-11 DIAGNOSIS — F32.A DEPRESSION, UNSPECIFIED DEPRESSION TYPE: ICD-10-CM

## 2018-09-11 DIAGNOSIS — F11.20 OPIOID USE DISORDER, SEVERE, DEPENDENCE: ICD-10-CM

## 2018-09-11 DIAGNOSIS — F43.10 PTSD (POST-TRAUMATIC STRESS DISORDER): ICD-10-CM

## 2018-09-11 PROBLEM — F10.90 ALCOHOL USE DISORDER: Status: ACTIVE | Noted: 2018-09-11

## 2018-09-11 LAB
ALBUMIN SERPL BCP-MCNC: 4.5 G/DL
ALP SERPL-CCNC: 89 U/L
ALT SERPL W/O P-5'-P-CCNC: 35 U/L
AMPHET+METHAMPHET UR QL: NORMAL
ANION GAP SERPL CALC-SCNC: 9 MMOL/L
APAP SERPL-MCNC: <3 UG/ML
AST SERPL-CCNC: 24 U/L
BARBITURATES UR QL SCN>200 NG/ML: NEGATIVE
BASOPHILS # BLD AUTO: 0.06 K/UL
BASOPHILS NFR BLD: 0.6 %
BENZODIAZ UR QL SCN>200 NG/ML: NORMAL
BILIRUB SERPL-MCNC: 1 MG/DL
BILIRUB UR QL STRIP: NEGATIVE
BUN SERPL-MCNC: 11 MG/DL
BZE UR QL SCN: NEGATIVE
CALCIUM SERPL-MCNC: 10.1 MG/DL
CANNABINOIDS UR QL SCN: NORMAL
CHLORIDE SERPL-SCNC: 104 MMOL/L
CLARITY UR REFRACT.AUTO: CLEAR
CO2 SERPL-SCNC: 26 MMOL/L
COLOR UR AUTO: YELLOW
CREAT SERPL-MCNC: 0.8 MG/DL
CREAT UR-MCNC: 189 MG/DL
DIFFERENTIAL METHOD: NORMAL
EOSINOPHIL # BLD AUTO: 0 K/UL
EOSINOPHIL NFR BLD: 0.3 %
ERYTHROCYTE [DISTWIDTH] IN BLOOD BY AUTOMATED COUNT: 13.2 %
EST. GFR  (AFRICAN AMERICAN): >60 ML/MIN/1.73 M^2
EST. GFR  (NON AFRICAN AMERICAN): >60 ML/MIN/1.73 M^2
ETHANOL SERPL-MCNC: <10 MG/DL
GLUCOSE SERPL-MCNC: 102 MG/DL
GLUCOSE UR QL STRIP: NEGATIVE
HCT VFR BLD AUTO: 49.5 %
HGB BLD-MCNC: 17.1 G/DL
HGB UR QL STRIP: NEGATIVE
IMM GRANULOCYTES # BLD AUTO: 0.04 K/UL
IMM GRANULOCYTES NFR BLD AUTO: 0.4 %
KETONES UR QL STRIP: NEGATIVE
LEUKOCYTE ESTERASE UR QL STRIP: NEGATIVE
LYMPHOCYTES # BLD AUTO: 1.8 K/UL
LYMPHOCYTES NFR BLD: 18.5 %
MCH RBC QN AUTO: 30.8 PG
MCHC RBC AUTO-ENTMCNC: 34.5 G/DL
MCV RBC AUTO: 89 FL
METHADONE UR QL SCN>300 NG/ML: NEGATIVE
MICROSCOPIC COMMENT: NORMAL
MONOCYTES # BLD AUTO: 0.8 K/UL
MONOCYTES NFR BLD: 8.1 %
NEUTROPHILS # BLD AUTO: 6.8 K/UL
NEUTROPHILS NFR BLD: 72.1 %
NITRITE UR QL STRIP: NEGATIVE
NRBC BLD-RTO: 0 /100 WBC
OPIATES UR QL SCN: NEGATIVE
PCP UR QL SCN>25 NG/ML: NEGATIVE
PH UR STRIP: 6 [PH] (ref 5–8)
PLATELET # BLD AUTO: 219 K/UL
PMV BLD AUTO: 11.5 FL
POTASSIUM SERPL-SCNC: 4.5 MMOL/L
PROT SERPL-MCNC: 7.9 G/DL
PROT UR QL STRIP: NEGATIVE
RBC # BLD AUTO: 5.56 M/UL
RBC #/AREA URNS AUTO: 1 /HPF (ref 0–4)
SALICYLATES SERPL-MCNC: <5 MG/DL
SODIUM SERPL-SCNC: 139 MMOL/L
SP GR UR STRIP: 1.02 (ref 1–1.03)
SQUAMOUS #/AREA URNS AUTO: 0 /HPF
TOXICOLOGY INFORMATION: NORMAL
TSH SERPL DL<=0.005 MIU/L-ACNC: 0.55 UIU/ML
URN SPEC COLLECT METH UR: NORMAL
UROBILINOGEN UR STRIP-ACNC: 1 EU/DL
WBC # BLD AUTO: 9.5 K/UL
WBC #/AREA URNS AUTO: 0 /HPF (ref 0–5)

## 2018-09-11 PROCEDURE — 3078F DIAST BP <80 MM HG: CPT | Mod: CPTII,S$GLB,, | Performed by: PSYCHIATRY & NEUROLOGY

## 2018-09-11 PROCEDURE — 99214 OFFICE O/P EST MOD 30 MIN: CPT | Mod: S$GLB,,, | Performed by: PSYCHIATRY & NEUROLOGY

## 2018-09-11 PROCEDURE — 99283 EMERGENCY DEPT VISIT LOW MDM: CPT | Mod: ,,, | Performed by: NURSE PRACTITIONER

## 2018-09-11 PROCEDURE — 3074F SYST BP LT 130 MM HG: CPT | Mod: CPTII,S$GLB,, | Performed by: PSYCHIATRY & NEUROLOGY

## 2018-09-11 PROCEDURE — 25000003 PHARM REV CODE 250: Performed by: EMERGENCY MEDICINE

## 2018-09-11 PROCEDURE — 99285 EMERGENCY DEPT VISIT HI MDM: CPT

## 2018-09-11 PROCEDURE — 99284 EMERGENCY DEPT VISIT MOD MDM: CPT | Mod: ,,, | Performed by: EMERGENCY MEDICINE

## 2018-09-11 PROCEDURE — 80329 ANALGESICS NON-OPIOID 1 OR 2: CPT

## 2018-09-11 PROCEDURE — 80307 DRUG TEST PRSMV CHEM ANLYZR: CPT

## 2018-09-11 PROCEDURE — 80053 COMPREHEN METABOLIC PANEL: CPT

## 2018-09-11 PROCEDURE — 84443 ASSAY THYROID STIM HORMONE: CPT

## 2018-09-11 PROCEDURE — 85025 COMPLETE CBC W/AUTO DIFF WBC: CPT

## 2018-09-11 PROCEDURE — 81001 URINALYSIS AUTO W/SCOPE: CPT

## 2018-09-11 PROCEDURE — 3008F BODY MASS INDEX DOCD: CPT | Mod: CPTII,S$GLB,, | Performed by: PSYCHIATRY & NEUROLOGY

## 2018-09-11 PROCEDURE — 80320 DRUG SCREEN QUANTALCOHOLS: CPT

## 2018-09-11 PROCEDURE — 99999 PR PBB SHADOW E&M-EST. PATIENT-LVL II: CPT | Mod: PBBFAC,,, | Performed by: PSYCHIATRY & NEUROLOGY

## 2018-09-11 RX ORDER — IBUPROFEN 400 MG/1
800 TABLET ORAL
Status: COMPLETED | OUTPATIENT
Start: 2018-09-11 | End: 2018-09-11

## 2018-09-11 RX ORDER — DIAZEPAM 5 MG/1
5 TABLET ORAL
Status: COMPLETED | OUTPATIENT
Start: 2018-09-11 | End: 2018-09-11

## 2018-09-11 RX ORDER — MAG HYDROX/ALUMINUM HYD/SIMETH 200-200-20
5 SUSPENSION, ORAL (FINAL DOSE FORM) ORAL
Status: COMPLETED | OUTPATIENT
Start: 2018-09-11 | End: 2018-09-11

## 2018-09-11 RX ADMIN — DIAZEPAM 5 MG: 5 TABLET ORAL at 09:09

## 2018-09-11 RX ADMIN — ALUMINUM HYDROXIDE, MAGNESIUM HYDROXIDE, AND SIMETHICONE 5 ML: 200; 200; 20 SUSPENSION ORAL at 09:09

## 2018-09-11 RX ADMIN — IBUPROFEN 800 MG: 400 TABLET, FILM COATED ORAL at 06:09
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
## 2018-09-11 NOTE — ED NOTES
Pt ambulated via wheelchair from ED to [facility], escorted by [facility] staff and . Belongings sent home with wife, Jenna. Original  PEC document placed in pt's chart.  [facility] notified of pt's transfer to [facility]. Pt is calm and cooperative.Per pt's request, pt 's next of kin (wife- Jenna) was notified of transfer to [facility]. Pt and wife were educated re: PEC status. Pt is dressed in blue paper scrubs. Resting comfortably in BH1. Respirations even and unlabored. NAD observed. Will cont to monitor.

## 2018-09-11 NOTE — ED NOTES
Admit packet faxed to [facility], [facility], [facility], and [facility].

## 2018-09-11 NOTE — ASSESSMENT & PLAN NOTE
-Monitor for opioid withdrawal symptoms and provide opioid withdrawal protocol  -Once patient's suicidal and depressive symptoms are stabilized then the patient will be referred to addiction residential treatment to address his alcohol and opioid use disorders.

## 2018-09-11 NOTE — ASSESSMENT & PLAN NOTE
Dispo/Legal Status:   -Continue PEC at this time as the pt is currently danger to self.   -Seek inpt bed for pt safety and stabilization when medically cleared by the ER MD.  2. Scheduled Medications:  Continue meds of zoloft 100 mg pod daily to avoid SRRIs withdrawals or  Defer any non-psych meds to the ER MD.   3. PRN Medications: Once patient show symptoms of alcohol withdrawal and opioid withdrawals defer to ED MD to initiate a detox protocol if    4. Precautions/Nursing: q4h vitals, withdrawal precautions from alcohol an opioids  5. To-Do: Continue to observe pt's behavior while in the ER and will reassess the pt  until placement is found for inpatient.

## 2018-09-11 NOTE — ED NOTES
Admit packet faxed to [facility], [facility], [facility], [facility], [facility], [facility], [facility], [place],   Waiting for response.

## 2018-09-11 NOTE — ED NOTES
"Pt arrived to ER with wife, immediately upon arrival was assigned a sitter--- Upon evaluation, pt states that he sustained a "major brain injury" on [place] day 2015. Pt states that he has been extremely depressed with thoughts of suicide for several weeks. Wife states "he has resorted to using drugs to get through this and is beginning to become dysfunctional". Pt states that his temper has been bordering uncontrollable, he feels agitated and aggressive. Pt having trouble gathering thoughts, wife constantly having to direct patient and reminding him of meds taken, actions at home. Pt annoyed. Alert and oriented x 4  "
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
## 2018-09-11 NOTE — ASSESSMENT & PLAN NOTE
Once patient's suicidal symptoms are stabilized, and his alcohol and opioid detox is complete that patient would benefit from a referral to neurology for further assessment and care.

## 2018-09-11 NOTE — ED NOTES
Pt was wearing one gold colored necklace  with gold colored cross attached. Pt was asked to hand it to this RN. Necklace was placed in security envelope and placed in secured belongings closet.

## 2018-09-11 NOTE — ED NOTES
Patient accepted by corey at [facility] ([address], [place]) for the service of Dr. Denny.  Report to be called to [phone].

## 2018-09-11 NOTE — SUBJECTIVE & OBJECTIVE
Patient History           Medical as of 9/11/2018     Past Medical History     Diagnosis Date Comments Source    ADHD (attention deficit hyperactivity disorder) -- -- Provider    Anxiety -- -- Provider    Nadia Barr virus infection -- -- Provider                  Surgical as of 9/11/2018     Past Surgical History     Procedure Laterality Date Comments Source    TONSILLECTOMY -- -- -- Provider                  Family as of 9/11/2018     Problem Relation Name Age of Onset Comments Source    Heart disease Father -- -- -- Provider    Hypertension Father -- -- -- Provider    ADD / ADHD Brother -- -- -- Provider            Tobacco Use as of 9/11/2018     Smoking Status Smoking Start Date Smoking Quit Date Packs/Day Years Used    Current Every Day Smoker -- -- 1.00 10.00    Types Comments Smokeless Tobacco Status Smokeless Tobacco Quit Date Source     Cigarettes -- Never Used -- Provider            Alcohol Use as of 9/11/2018     Alcohol Use Drinks/Week Alcohol/Week Comments Source    Yes 1 Glasses of wine 0.6 oz a glass wine / beer per night Provider    Frequency Standard Drinks Binge Drinking Source      -- -- -- Provider             Drug Use as of 9/11/2018     Drug Use Types Frequency Comments Source    No -- -- -- Provider            Sexual Activity as of 9/11/2018     Sexually Active Birth Control Partners Comments Source    Yes None Female -- Provider            Activities of Daily Living as of 9/11/2018    None           Social Documentation as of 9/11/2018    None           Occupational as of 9/11/2018     Occupation Employer Comments Source    electrical enginier, 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            Socioeconomic as of 9/11/2018     Marital Status Spouse Name Number of Children Years Education Education Level Preferred Language Ethnicity Race Source     -- -- -- -- English /White White Provider    Financial Resource Strain Food Insecurity: Worry Food Insecurity: Inability  Transportation Needs: Medical Transportation Needs: Non-medical       -- -- -- -- --             Pertinent History     Question Response Comments    Lives with -- --    Place in Birth Order -- --    Lives in -- --    Number of Siblings -- --    Raised by -- --    Legal Involvement -- --    Childhood Trauma -- --    Criminal History of -- --    Financial Status -- --    Highest Level of Education -- --    Does patient have access to a firearm? -- --     Service -- --    Primary Leisure Activity -- --    Spirituality -- --        Past Medical History:   Diagnosis Date    ADHD (attention deficit hyperactivity disorder)     Anxiety     Nadia Barr virus infection      Past Surgical History:   Procedure Laterality Date    TONSILLECTOMY       Family History     Problem Relation (Age of Onset)    ADD / ADHD Brother    Heart disease Father    Hypertension Father        Tobacco Use    Smoking status: Current Every Day Smoker     Packs/day: 1.00     Years: 10.00     Pack years: 10.00     Types: Cigarettes    Smokeless tobacco: Never Used   Substance and Sexual Activity    Alcohol use: Yes     Alcohol/week: 0.6 oz     Types: 1 Glasses of wine per week     Comment: a glass wine / beer per night    Drug use: No    Sexual activity: Yes     Partners: Female     Birth control/protection: None     Review of patient's allergies indicates:  No Known Allergies    No current facility-administered medications on file prior to encounter.      Current Outpatient Medications on File Prior to Encounter   Medication Sig    clorazepate (TRANXENE) 7.5 MG Tab Take 1 tablet (7.5 mg total) by mouth daily as needed (anxiety).    dextroamphetamine-amphetamine (ADDERALL XR) 30 MG 24 hr capsule Take 1 capsule (30 mg total) by mouth every morning.    dextroamphetamine-amphetamine (ADDERALL) 10 mg Tab Take 1 tablet by mouth once daily.    sertraline (ZOLOFT) 100 MG tablet TAKE ONE TABLET BY MOUTH ONCE DAILY     Psychotherapeutics  (From admission, onward)    None        Review of Systems   Constitutional: Positive for appetite change and fatigue.   HENT: Negative.    Eyes: Negative.    Respiratory: Negative.    Cardiovascular: Negative.    Gastrointestinal: Negative.    Endocrine: Negative.    Genitourinary: Negative.    Neurological: Negative for seizures.   Hematological: Negative.    Psychiatric/Behavioral: Positive for decreased concentration, dysphoric mood, self-injury, sleep disturbance and suicidal ideas. The patient is nervous/anxious.      Strengths and Liabilities: Strength: Patient accepts guidance/feedback, Strength: Patient is intelligent., Strength: Patient is motivated for change., Strength: Patient has positive support network., Liability: Patient has poor judgment, Liability: Patient lacks coping skills.    Objective:     Vital Signs (Most Recent):  Temp: 98 °F (36.7 °C) (09/11/18 1600)  Pulse: 73 (09/11/18 1600)  Resp: 18 (09/11/18 1600)  BP: (!) 133/91 (09/11/18 1600)  SpO2: 98 % (09/11/18 1032) Vital Signs (24h Range):  Temp:  [98 °F (36.7 °C)] 98 °F (36.7 °C)  Pulse:  [73-84] 73  Resp:  [18] 18  SpO2:  [98 %] 98 %  BP: (133-154)/(75-93) 133/91        Weight: 90.7 kg (200 lb)  Body mass index is 27.89 kg/m².    No intake or output data in the 24 hours ending 09/11/18 1643    Physical Exam   Constitutional: He is oriented to person, place, and time. He appears well-developed.   HENT:   Head: Normocephalic.   Neurological: He is alert and oriented to person, place, and time.     NEUROLOGICAL EXAMINATION:     MENTAL STATUS   Oriented to person, place, and time.   Attention: normal.   Level of consciousness: alert  Knowledge: consistent with education.        REVIEW OF SYSTEMS      MENTAL STATUS EXAM  General Appearance: Hospital clothes  Behavior: cooperative   Speech: normal rate and volume  Mood: Depressed and anxious  Affect: mood congruent  Thought Process: Tangential  Thought Content: SI without a plan; VHs of shadows,  and not HI/AH  Memory: Intact  Insight: Limited  Judgment: Impaired  Orientation:  intact  Language:intact  Attention Span & Concentration: able to focus  Fund of Knowledge:  intact and appropriate to age and level of education              Significant Labs:   Last 24 Hours:   Recent Lab Results       09/11/18  1320 09/11/18  1315      Benzodiazepines Presumptive Positive      Methadone metabolites Negative      Phencyclidine Negative      Immature Granulocytes  0.4     Immature Grans (Abs)  0.04  Comment:  Mild elevation in immature granulocytes is non specific and   can be seen in a variety of conditions including stress response,   acute inflammation, trauma and pregnancy. Correlation with other   laboratory and clinical findings is essential.       Acetaminophen (Tylenol), Serum  <3.0  Comment:  Toxic Levels:  Adults (4 hr post-ingestion).........>150 ug/mL  Adults (12 hr post-ingestion)........>40 ug/mL  Peds (2 hr post-ingestion, liquid)...>225 ug/mL       Albumin  4.5     Alcohol, Medical, Serum  <10     Alkaline Phosphatase  89     ALT  35     Amphetamine Screen, Ur Presumptive Positive      Anion Gap  9     Appearance, UA Clear      AST  24     Barbiturate Screen, Ur Negative      Baso #  0.06     Basophil%  0.6     Bilirubin (UA) Negative      Total Bilirubin  1.0  Comment:  For infants and newborns, interpretation of results should be based  on gestational age, weight and in agreement with clinical  observations.  Premature Infant recommended reference ranges:  Up to 24 hours.............<8.0 mg/dL  Up to 48 hours............<12.0 mg/dL  3-5 days..................<15.0 mg/dL  6-29 days.................<15.0 mg/dL       BUN, Bld  11     Calcium  10.1     Chloride  104     CO2  26     Cocaine (Metab.) Negative      Color, UA Yellow      Creatinine  0.8     Creatinine, Random Ur 189.0  Comment:  The random urine reference ranges provided were established   for 24 hour urine collections.  No reference ranges  exist for  random urine specimens.  Correlate clinically.        Differential Method  Automated     eGFR if African American  >60.0     eGFR if non   >60.0  Comment:  Calculation used to obtain the estimated glomerular filtration  rate (eGFR) is the CKD-EPI equation.        Eos #  0.0     Eosinophil%  0.3     Glucose  102     Glucose, UA Negative      Gran # (ANC)  6.8     Gran%  72.1     Hematocrit  49.5     Hemoglobin  17.1     Ketones, UA Negative      Leukocytes, UA Negative      Lymph #  1.8     Lymph%  18.5     MCH  30.8     MCHC  34.5     MCV  89     Microscopic Comment SEE COMMENT  Comment:  Other formed elements not mentioned in the report are not   present in the microscopic examination.         Mono #  0.8     Mono%  8.1     MPV  11.5     Nitrite, UA Negative      nRBC  0     Occult Blood UA Negative      Opiate Scrn, Ur Negative      pH, UA 6.0      Platelets  219     Potassium  4.5     Total Protein  7.9     Protein, UA Negative  Comment:  Recommend a 24 hour urine protein or a urine   protein/creatinine ratio if globulin induced proteinuria is  clinically suspected.        RBC  5.56     RBC, UA 1      RDW  13.2     Salicylate Lvl  <5.0  Comment:  Toxic:  30.0 - 70.0 mg/dl  Lethal: >70.0 mg/dl       Sodium  139     Specific Gravity, UA 1.020      Specimen UA Urine, Clean Catch      Squam Epithel, UA 0      Marijuana (THC) Metabolite Presumptive Positive      Toxicology Information SEE COMMENT  Comment:  This screen includes the following classes of drugs at the   listed cut-off:  Benzodiazepines                  200 ng/ml  Methadone                        300 ng/ml  Cocaine metabolite               300 ng/ml  Opiates                          300 ng/ml  Barbiturates                     200 ng/ml  Amphetamines                    1000 ng/ml  Marijuana metabs (THC)            50 ng/ml  Phencyclidine (PCP)               25 ng/ml  High concentrations of Diphenhydramine may cross-react  with  Phencyclidine PCP screening immunoassay giving a false   positive result.  High concentrations of Methylenedioxymethamphetamine (MDMA aka  Ectasy) and other structurally similar compounds may cross-   react with the Amphetamine/Methamphetamine screening   immunoassay giving a false positive result.  A metabolite of the anti-HIV drug Sustiva () may cause  false positive results in the Marijuana metabolite (THC)   screening assay.  Note: This exception list includes only more common   interferants in toxicology screen testing.  Because of many   cross-reactantspositive results on toxicology drug screens   should be confirmed whenever results do not correlate with   clinical presentation.  This report is intended for use in clinical monitoring and  management of patients. It is not intended for use in   employment related drug testing.  Because of any cross-reactants, positive results on toxicology  drug screens should be confirmed whenever results do not  correlate with clinical presentation.  Presumptive positive results are unconfirmed and may be used   only for medical purposes.        TSH  0.545     Urobilinogen, UA 1.0  Comment:  Corrected result; previously reported as 2.0 on 09/11/2018 at 13:39.[C]      WBC, UA 0      WBC  9.50           Significant Imaging: I have reviewed all pertinent imaging results/findings within the past 24 hours.

## 2018-09-11 NOTE — ASSESSMENT & PLAN NOTE
Reports drinking  12 beers daily or 1 bottle of scotch daily  -No previous rehab history  -Last drink night of 9/9  -Denies seizure and ICU admissions  -Monitor patient for alcohol withdrawal symptoms and defer to ED for treatment of alcohol withdrawals  -Withdrawal precautions  -Once patient is suicidal and depressive symptoms stabilized at inpatient psychiatric unit then he would benefit form referral to residential rehab facility.
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
## 2018-09-11 NOTE — ED PROVIDER NOTES
Encounter Date: 9/11/2018    SCRIBE #1 NOTE: I, Son Sepideh, am scribing for, and in the presence of,  Dr. Thomas . I have scribed the following portions of the note - the Resident attestation.       History     Chief Complaint   Patient presents with    Suicidal     came with PEC from clinic     Mr. Wall is a 32 yo M with PMHx of anxiety, depression, ADHD, PTSD, mood disorder, and opioid use disorder who presents from Psychiatry clinic with complaint of suicidal ideation. Pt states that his symptoms began following a TBI in December 2015. He states following this incident has has had difficulty regaining his since of self and keeping up with the routines of daily living. This difficulty has recently been complicated by increased aggression and frustration on a daily basis which has lead to frequent combativeness with family. Per wife, at bedside, intervention was performed yesterday with she, the patient, and his parents where he agreed to seek help to cope with his ongoing issues. He presented to Psychiatry clinic this morning where he was seen by Dr. Vizcarra where he admitted to frequent nightmares, difficulty coping, and suicidal ideation. These symptoms were second following a collateral conversation with patient's wife. Patient was subequently PEC'd and transferred to [facility] ED for further care.    Of note, pt has been followed by PCP, Dr. Qiu, where he was been prescribed Zoloft, Tranxene, and Adderall. In addition, pt endorses having abused Oxycontin in the past as well as Suboxone.           Review of patient's allergies indicates:  No Known Allergies  Past Medical History:   Diagnosis Date    ADHD (attention deficit hyperactivity disorder)     Anxiety     Nadia Barr virus infection      Past Surgical History:   Procedure Laterality Date    TONSILLECTOMY       Family History   Problem Relation Age of Onset    Heart disease Father     Hypertension Father     ADD / ADHD Brother      Social History      Tobacco Use    Smoking status: Current Every Day Smoker     Packs/day: 1.00     Years: 10.00     Pack years: 10.00     Types: Cigarettes    Smokeless tobacco: Never Used   Substance Use Topics    Alcohol use: Yes     Alcohol/week: 0.6 oz     Types: 1 Glasses of wine per week     Comment: a glass wine / beer per night    Drug use: No     Review of Systems   Constitutional: Positive for activity change. Negative for appetite change, fatigue and fever.   HENT: Negative.    Eyes: Negative.    Respiratory: Negative.    Cardiovascular: Negative.    Gastrointestinal: Negative.    Endocrine: Negative.    Genitourinary: Negative.    Musculoskeletal: Negative.    Skin: Negative.    Neurological: Negative.    Psychiatric/Behavioral: Positive for agitation, behavioral problems, dysphoric mood, sleep disturbance and suicidal ideas. The patient is nervous/anxious.        Physical Exam     Initial Vitals [09/11/18 1032]   BP Pulse Resp Temp SpO2   (!) 154/75 73 18 98 °F (36.7 °C) 98 %      MAP       --         Physical Exam    Constitutional: He appears well-developed and well-nourished. He appears distressed.   HENT:   Head: Normocephalic and atraumatic.   Eyes: EOM are normal. Pupils are equal, round, and reactive to light.   Neck: Normal range of motion.   Cardiovascular: Normal rate and regular rhythm.   Pulmonary/Chest: Breath sounds normal. No respiratory distress.   Abdominal: Soft. Bowel sounds are normal.   Musculoskeletal: Normal range of motion.   Neurological: He is alert and oriented to person, place, and time. He has normal strength.   Psychiatric:   Anxious, difficulty with expression, requires redirectioning         ED Course   Procedures  Labs Reviewed   ACETAMINOPHEN LEVEL - Abnormal; Notable for the following components:       Result Value    Acetaminophen (Tylenol), Serum <3.0 (*)     All other components within normal limits   SALICYLATE LEVEL - Abnormal; Notable for the following components:     Salicylate Lvl <5.0 (*)     All other components within normal limits   CBC W/ AUTO DIFFERENTIAL   COMPREHENSIVE METABOLIC PANEL   TSH   URINALYSIS, REFLEX TO URINE CULTURE    Narrative:     Preferred Collection Type->Urine, Clean Catch   DRUG SCREEN PANEL, URINE EMERGENCY    Narrative:     Preferred Collection Type->Urine, Clean Catch   ALCOHOL,MEDICAL (ETHANOL)   URINALYSIS MICROSCOPIC    Narrative:     Preferred Collection Type->Urine, Clean Catch          Imaging Results    None          Medical Decision Making:   Initial Assessment:   Mr. Wall is a 32 yo M with PMHx of anxiety, depression, ADHD, PTSD, mood disorder, and opioid use disorder who presents from Psychiatry clinic with complaint of suicidal ideation.   Differential Diagnosis:   PTSD vs Depression vs Other mood disorder   Clinical Tests:   Lab Tests: Ordered  ED Management:  12:10 - CBC, CMP, Drug Screen, Ethanol level, Acetaminophen level, Salicylate level, and UA ordered. Psychiatry consulted.  14:30: Lab results WNL  Pt has been medically cleared for inpatient Psychiatric admission. Pt has been PEC'd prior to arrival to [facility] ED. Psychiatry consulted for evaluation and inpatient admission.            Scribe Attestation:   Scribe #1: I performed the above scribed service and the documentation accurately describes the services I performed. I attest to the accuracy of the note.    Attending Attestation:   Physician Attestation Statement for Resident:  As the supervising MD   Physician Attestation Statement: I have personally seen and examined this patient.   I agree with the above history. -: Suicidal allegation and depression    As the supervising MD I agree with the above PE.    As the supervising MD I agree with the above treatment, course, plan, and disposition.                       Clinical Impression:   The primary encounter diagnosis was Suicidal ideation. A diagnosis of Depression, unspecified depression type was also pertinent to this  visit.                  I, Dr. Crescencio Thomas III, personally performed the services described in this documentation. All medical record entries made by the scribe were at my direction and in my presence.  I have reviewed the chart and agree that the record reflects my personal performance and is accurate and complete. Crescencio Thomas III, MD.  7:46 PM 09/11/2018           Crescencio Thomas III, MD  09/11/18 2954       Crescencio Thomas III, MD  09/11/18 6366

## 2018-09-11 NOTE — ED NOTES
"Pt interview reveals:     Pt had traumatic Brain Injury Christmas 2015 and has lost memories prior to event and states that he has had a very difficult time trying to figure out "who I am" - states he does not have childhood memories and has lost much of his daily functioning as it relates to work as an  - states that he is "turning to drinking and taking pain pills" and is not able to work due to his addiction - states he has taken suboxone and "I would take a hundred of them right now if I had them" - "I feel f*#&ing lost and I can't be around people" - he states he has been crying and continues to state that he doesn't remember "what I was like before" - he also states he is "not participating at home" with his family    Wife describes that he has been very depressed and she takes very seriously the threats that he wants to shoot himself in the head - she states that there are guns in the home     "

## 2018-09-12 NOTE — ED NOTES
Wife Jenna Wall called and informed patient going to [facility]. Contact information given. Transport called.

## 2018-09-12 NOTE — ED NOTES
Pt's wife, Jenna Wall ([phone]), notified of pt's pending transfer to [facility], and was given contact information. Verbalized understanding.

## 2018-09-12 NOTE — ED NOTES
Nurse Dominguez with [facility] informed of patient receiving Valium 5 mg and Aluminum-magnesium Hydroxide Simethicone 5ml .
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
## 2018-09-12 NOTE — ED NOTES
Rebeca from [facility] intake change placement from [facility] due to change in male bed situation.

## 2018-09-26 ENCOUNTER — OFFICE VISIT (OUTPATIENT)
Dept: PRIMARY CARE CLINIC | Facility: CLINIC | Age: 33
End: 2018-09-26
Payer: COMMERCIAL

## 2018-09-26 VITALS
RESPIRATION RATE: 18 BRPM | OXYGEN SATURATION: 99 % | BODY MASS INDEX: 28.42 KG/M2 | WEIGHT: 203 LBS | HEIGHT: 71 IN | SYSTOLIC BLOOD PRESSURE: 139 MMHG | HEART RATE: 98 BPM | TEMPERATURE: 99 F | DIASTOLIC BLOOD PRESSURE: 88 MMHG

## 2018-09-26 DIAGNOSIS — F90.2 ATTENTION DEFICIT HYPERACTIVITY DISORDER (ADHD), COMBINED TYPE: ICD-10-CM

## 2018-09-26 DIAGNOSIS — F43.10 PTSD (POST-TRAUMATIC STRESS DISORDER): Primary | ICD-10-CM

## 2018-09-26 DIAGNOSIS — F10.90 ALCOHOL USE DISORDER: ICD-10-CM

## 2018-09-26 DIAGNOSIS — F11.20 OPIOID USE DISORDER, SEVERE, DEPENDENCE: ICD-10-CM

## 2018-09-26 PROCEDURE — 3079F DIAST BP 80-89 MM HG: CPT | Mod: CPTII,S$GLB,, | Performed by: FAMILY MEDICINE

## 2018-09-26 PROCEDURE — 99999 PR PBB SHADOW E&M-EST. PATIENT-LVL III: CPT | Mod: PBBFAC,,, | Performed by: FAMILY MEDICINE

## 2018-09-26 PROCEDURE — 99213 OFFICE O/P EST LOW 20 MIN: CPT | Mod: S$GLB,,, | Performed by: FAMILY MEDICINE

## 2018-09-26 PROCEDURE — 3008F BODY MASS INDEX DOCD: CPT | Mod: CPTII,S$GLB,, | Performed by: FAMILY MEDICINE

## 2018-09-26 PROCEDURE — 3075F SYST BP GE 130 - 139MM HG: CPT | Mod: CPTII,S$GLB,, | Performed by: FAMILY MEDICINE

## 2018-09-26 RX ORDER — FOLIC ACID 1 MG/1
2 TABLET ORAL DAILY
COMMUNITY
Start: 2018-09-16

## 2018-09-26 RX ORDER — DEXTROAMPHETAMINE SACCHARATE, AMPHETAMINE ASPARTATE MONOHYDRATE, DEXTROAMPHETAMINE SULFATE AND AMPHETAMINE SULFATE 7.5; 7.5; 7.5; 7.5 MG/1; MG/1; MG/1; MG/1
30 CAPSULE, EXTENDED RELEASE ORAL EVERY MORNING
Qty: 15 CAPSULE | Refills: 0 | Status: SHIPPED | OUTPATIENT
Start: 2018-09-26

## 2018-09-26 RX ORDER — NALTREXONE HYDROCHLORIDE 50 MG/1
1 TABLET, FILM COATED ORAL NIGHTLY
COMMUNITY
Start: 2018-09-16

## 2018-09-26 RX ORDER — DEXTROAMPHETAMINE SACCHARATE, AMPHETAMINE ASPARTATE MONOHYDRATE, DEXTROAMPHETAMINE SULFATE AND AMPHETAMINE SULFATE 7.5; 7.5; 7.5; 7.5 MG/1; MG/1; MG/1; MG/1
30 CAPSULE, EXTENDED RELEASE ORAL EVERY MORNING
Qty: 30 CAPSULE | Refills: 0 | Status: CANCELLED | OUTPATIENT
Start: 2018-09-26

## 2018-09-26 RX ORDER — OXCARBAZEPINE 300 MG/1
1 TABLET, FILM COATED ORAL 2 TIMES DAILY
COMMUNITY
Start: 2018-09-16

## 2018-09-26 RX ORDER — DEXTROAMPHETAMINE SACCHARATE, AMPHETAMINE ASPARTATE, DEXTROAMPHETAMINE SULFATE AND AMPHETAMINE SULFATE 2.5; 2.5; 2.5; 2.5 MG/1; MG/1; MG/1; MG/1
1 TABLET ORAL DAILY
Qty: 30 TABLET | Refills: 0 | Status: CANCELLED | OUTPATIENT
Start: 2018-09-26

## 2018-09-26 RX ORDER — QUETIAPINE FUMARATE 100 MG/1
1 TABLET, FILM COATED ORAL DAILY
COMMUNITY
Start: 2018-09-16

## 2018-09-26 NOTE — PROGRESS NOTES
"Subjective:       Patient ID: Zaheer Wall is a 33 y.o. male.    Chief Complaint: ADHD    Patient was admitted to an inpatient psychiatric facility earlier this month after expressing suicidal ideation at his initial Psychiatry appointment.  Canceled subsequent follow-up appointment that was scheduled for today.  While at inpatient facility, he reports that he has come to increasing self-awareness regarding his substance abuse issues.  He has not had any alcohol other than nonalcoholic beer since earlier in the month.  He had previously been drinking a bottle of scotch daily.  Denies any recurrence suicidal or homicidal ideation.  Says he does not want to follow up with the psychiatrist that he saw previously, prefers to come here.      Review of Systems   Constitutional: Negative for fever.   HENT: Negative for trouble swallowing.    Respiratory: Negative for shortness of breath.    Cardiovascular: Negative for chest pain.   Gastrointestinal: Negative for nausea.   Musculoskeletal: Positive for arthralgias.   Psychiatric/Behavioral: Negative for self-injury and suicidal ideas. The patient is nervous/anxious.        Objective:      Vitals:    09/26/18 1156   BP: 139/88   BP Location: Left arm   Patient Position: Sitting   BP Method: Large (Automatic)   Pulse: 98   Resp: 18   Temp: 98.7 °F (37.1 °C)   TempSrc: Oral   SpO2: 99%   Weight: 92.1 kg (203 lb)   Height: 5' 11" (1.803 m)     Physical Exam   Constitutional: He is oriented to person, place, and time. He appears well-developed and well-nourished.   HENT:   Head: Normocephalic and atraumatic.   Eyes: EOM are normal.   Neck: No JVD present.   Cardiovascular: Normal rate, regular rhythm and normal heart sounds.   Pulmonary/Chest: Effort normal and breath sounds normal.   Musculoskeletal: He exhibits no edema.   Neurological: He is alert and oriented to person, place, and time.   Skin: Skin is warm and dry.   Psychiatric: Judgment normal. His mood appears anxious. " His speech is rapid and/or pressured. Cognition and memory are normal.   Nursing note and vitals reviewed.      Assessment:       1. PTSD (post-traumatic stress disorder)    2. Attention deficit hyperactivity disorder (ADHD), combined type    3. Alcohol use disorder    4. Opioid use disorder, severe, dependence        Plan:       PTSD (post-traumatic stress disorder)  Comments:  I am not comfortable continuing to manage his psychiatric issues.  Needs to follow up with Psychiatry, patient verbalized understanding  Orders:  -     Ambulatory referral to Psychiatry    Attention deficit hyperactivity disorder (ADHD), combined type  Comments:  One final refill 2 week supply of Adderall, needs to follow up with Psychiatry  Orders:  -     dextroamphetamine-amphetamine (ADDERALL XR) 30 MG 24 hr capsule; Take 1 capsule (30 mg total) by mouth every morning.  Dispense: 15 capsule; Refill: 0  -     Ambulatory referral to Psychiatry    Alcohol use disorder  -     Ambulatory referral to Psychiatry    Opioid use disorder, severe, dependence  -     Ambulatory referral to Psychiatry    Other orders  -     Cancel: dextroamphetamine-amphetamine (ADDERALL) 10 mg Tab; Take 1 tablet (10 mg total) by mouth once daily.  Dispense: 30 tablet; Refill: 0  -     Cancel: dextroamphetamine-amphetamine (ADDERALL XR) 30 MG 24 hr capsule; Take 1 capsule (30 mg total) by mouth every morning.  Dispense: 30 capsule; Refill: 0         Medication List           Accurate as of 9/26/18 12:28 PM. If you have any questions, ask your nurse or doctor.               CHANGE how you take these medications    dextroamphetamine-amphetamine 30 MG 24 hr capsule  Commonly known as:  ADDERALL XR  Take 1 capsule (30 mg total) by mouth every morning.  What changed:  Another medication with the same name was removed. Continue taking this medication, and follow the directions you see here.  Changed by:  Vivek Qiu MD        CONTINUE taking these medications     clorazepate 7.5 MG Tab  Commonly known as:  TRANXENE  Take 1 tablet (7.5 mg total) by mouth daily as needed (anxiety).     folic acid 1 MG tablet  Commonly known as:  FOLVITE     naltrexone 50 mg tablet  Commonly known as:  DEPADE     OXcarbazepine 300 MG Tab  Commonly known as:  TRILEPTAL     QUEtiapine 100 MG Tab  Commonly known as:  SEROQUEL     sertraline 100 MG tablet  Commonly known as:  ZOLOFT  TAKE ONE TABLET BY MOUTH ONCE DAILY           Where to Get Your Medications      These medications were sent to [facility] - [place], [place] - [address]  [address], [place] [address]    Phone:  [phone]   · dextroamphetamine-amphetamine 30 MG 24 hr capsule

## 2018-11-27 DIAGNOSIS — F41.9 ANXIETY: ICD-10-CM

## 2018-11-27 RX ORDER — CLORAZEPATE DIPOTASSIUM 7.5 MG/1
TABLET ORAL
Qty: 30 TABLET | OUTPATIENT
Start: 2018-11-27

## 2019-04-21 DIAGNOSIS — F43.10 PTSD (POST-TRAUMATIC STRESS DISORDER): ICD-10-CM

## 2019-04-21 RX ORDER — SERTRALINE HYDROCHLORIDE 100 MG/1
TABLET, FILM COATED ORAL
Qty: 60 TABLET | Refills: 5 | Status: SHIPPED | OUTPATIENT
Start: 2019-04-21